# Patient Record
Sex: FEMALE | Race: WHITE | NOT HISPANIC OR LATINO | Employment: FULL TIME | URBAN - METROPOLITAN AREA
[De-identification: names, ages, dates, MRNs, and addresses within clinical notes are randomized per-mention and may not be internally consistent; named-entity substitution may affect disease eponyms.]

---

## 2017-02-21 ENCOUNTER — HOSPITAL ENCOUNTER (OUTPATIENT)
Dept: RADIOLOGY | Facility: MEDICAL CENTER | Age: 52
Discharge: HOME | End: 2017-02-21
Payer: COMMERCIAL

## 2017-02-21 ENCOUNTER — TRANSCRIBE ORDERS (OUTPATIENT)
Dept: URGENT CARE | Facility: MEDICAL CENTER | Age: 52
End: 2017-02-21

## 2017-02-21 DIAGNOSIS — R05.9 COUGH: Primary | ICD-10-CM

## 2017-02-21 DIAGNOSIS — R05.9 COUGH: ICD-10-CM

## 2017-02-21 PROCEDURE — 71020 HB CHEST X-RAY 2VW FRONTAL&LATL: CPT

## 2017-10-25 ENCOUNTER — ALLSCRIPTS OFFICE VISIT (OUTPATIENT)
Dept: OTHER | Facility: OTHER | Age: 52
End: 2017-10-25

## 2017-10-25 DIAGNOSIS — Z12.11 ENCOUNTER FOR SCREENING FOR MALIGNANT NEOPLASM OF COLON: ICD-10-CM

## 2017-10-25 DIAGNOSIS — Z12.31 ENCOUNTER FOR SCREENING MAMMOGRAM FOR MALIGNANT NEOPLASM OF BREAST: ICD-10-CM

## 2017-10-25 DIAGNOSIS — M25.511 PAIN IN RIGHT SHOULDER: ICD-10-CM

## 2017-10-25 DIAGNOSIS — E78.5 HYPERLIPIDEMIA: ICD-10-CM

## 2017-10-25 DIAGNOSIS — R79.89 OTHER SPECIFIED ABNORMAL FINDINGS OF BLOOD CHEMISTRY: ICD-10-CM

## 2017-10-25 DIAGNOSIS — R51.9 HEADACHE: ICD-10-CM

## 2017-10-26 ENCOUNTER — TRANSCRIBE ORDERS (OUTPATIENT)
Dept: ADMINISTRATIVE | Facility: HOSPITAL | Age: 52
End: 2017-10-26

## 2017-10-26 DIAGNOSIS — J45.909 ASTHMATIC BRONCHITIS WITHOUT COMPLICATION, UNSPECIFIED ASTHMA SEVERITY, UNSPECIFIED WHETHER PERSISTENT: Primary | ICD-10-CM
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
## 2017-10-31 ENCOUNTER — TRANSCRIBE ORDERS (OUTPATIENT)
Dept: ADMINISTRATIVE | Facility: HOSPITAL | Age: 52
End: 2017-10-31

## 2017-10-31 ENCOUNTER — APPOINTMENT (OUTPATIENT)
Dept: LAB | Facility: MEDICAL CENTER | Age: 52
End: 2017-10-31
Payer: COMMERCIAL

## 2017-10-31 ENCOUNTER — APPOINTMENT (OUTPATIENT)
Dept: PHYSICAL THERAPY | Facility: MEDICAL CENTER | Age: 52
End: 2017-10-31
Payer: COMMERCIAL

## 2017-10-31 DIAGNOSIS — E78.5 HYPERLIPIDEMIA, UNSPECIFIED HYPERLIPIDEMIA TYPE: ICD-10-CM

## 2017-10-31 DIAGNOSIS — E78.5 HYPERLIPIDEMIA: ICD-10-CM

## 2017-10-31 DIAGNOSIS — R51.9 HEADACHE: ICD-10-CM

## 2017-10-31 DIAGNOSIS — R51.9 FACIAL PAIN: ICD-10-CM

## 2017-10-31 DIAGNOSIS — M25.511 PAIN IN RIGHT SHOULDER: ICD-10-CM

## 2017-10-31 DIAGNOSIS — R51.9 FACIAL PAIN: Primary | ICD-10-CM

## 2017-10-31 LAB
ALBUMIN SERPL BCP-MCNC: 3.7 G/DL (ref 3.5–5)
ALP SERPL-CCNC: 51 U/L (ref 46–116)
ALT SERPL W P-5'-P-CCNC: 21 U/L (ref 12–78)
ANION GAP SERPL CALCULATED.3IONS-SCNC: 8 MMOL/L (ref 4–13)
AST SERPL W P-5'-P-CCNC: 16 U/L (ref 5–45)
BASOPHILS # BLD AUTO: 0.01 THOUSANDS/ΜL (ref 0–0.1)
BASOPHILS NFR BLD AUTO: 0 % (ref 0–1)
BILIRUB SERPL-MCNC: 0.79 MG/DL (ref 0.2–1)
BUN SERPL-MCNC: 13 MG/DL (ref 5–25)
CALCIUM SERPL-MCNC: 9.1 MG/DL (ref 8.3–10.1)
CHLORIDE SERPL-SCNC: 105 MMOL/L (ref 100–108)
CHOLEST SERPL-MCNC: 231 MG/DL (ref 50–200)
CO2 SERPL-SCNC: 24 MMOL/L (ref 21–32)
CREAT SERPL-MCNC: 0.75 MG/DL (ref 0.6–1.3)
EOSINOPHIL # BLD AUTO: 0.04 THOUSAND/ΜL (ref 0–0.61)
EOSINOPHIL NFR BLD AUTO: 1 % (ref 0–6)
ERYTHROCYTE [DISTWIDTH] IN BLOOD BY AUTOMATED COUNT: 16.3 % (ref 11.6–15.1)
GFR SERPL CREATININE-BSD FRML MDRD: 93 ML/MIN/1.73SQ M
GLUCOSE P FAST SERPL-MCNC: 84 MG/DL (ref 65–99)
HCT VFR BLD AUTO: 39 % (ref 34.8–46.1)
HDLC SERPL-MCNC: 55 MG/DL (ref 40–60)
HGB BLD-MCNC: 12.5 G/DL (ref 11.5–15.4)
LDLC SERPL CALC-MCNC: 145 MG/DL (ref 0–100)
LYMPHOCYTES # BLD AUTO: 1.86 THOUSANDS/ΜL (ref 0.6–4.47)
LYMPHOCYTES NFR BLD AUTO: 28 % (ref 14–44)
MCH RBC QN AUTO: 27.7 PG (ref 26.8–34.3)
MCHC RBC AUTO-ENTMCNC: 32.1 G/DL (ref 31.4–37.4)
MCV RBC AUTO: 86 FL (ref 82–98)
MONOCYTES # BLD AUTO: 0.56 THOUSAND/ΜL (ref 0.17–1.22)
MONOCYTES NFR BLD AUTO: 8 % (ref 4–12)
NEUTROPHILS # BLD AUTO: 4.29 THOUSANDS/ΜL (ref 1.85–7.62)
NEUTS SEG NFR BLD AUTO: 63 % (ref 43–75)
NRBC BLD AUTO-RTO: 0 /100 WBCS
PLATELET # BLD AUTO: 364 THOUSANDS/UL (ref 149–390)
PMV BLD AUTO: 11.4 FL (ref 8.9–12.7)
POTASSIUM SERPL-SCNC: 4.2 MMOL/L (ref 3.5–5.3)
PROT SERPL-MCNC: 8.2 G/DL (ref 6.4–8.2)
RBC # BLD AUTO: 4.52 MILLION/UL (ref 3.81–5.12)
SODIUM SERPL-SCNC: 137 MMOL/L (ref 136–145)
TRIGL SERPL-MCNC: 157 MG/DL
TSH SERPL DL<=0.05 MIU/L-ACNC: 2.58 UIU/ML (ref 0.36–3.74)
WBC # BLD AUTO: 6.77 THOUSAND/UL (ref 4.31–10.16)

## 2017-10-31 PROCEDURE — 80061 LIPID PANEL: CPT

## 2017-10-31 PROCEDURE — 97161 PT EVAL LOW COMPLEX 20 MIN: CPT

## 2017-10-31 PROCEDURE — G8991 OTHER PT/OT GOAL STATUS: HCPCS

## 2017-10-31 PROCEDURE — 84443 ASSAY THYROID STIM HORMONE: CPT

## 2017-10-31 PROCEDURE — 36415 COLL VENOUS BLD VENIPUNCTURE: CPT

## 2017-10-31 PROCEDURE — 85025 COMPLETE CBC W/AUTO DIFF WBC: CPT

## 2017-10-31 PROCEDURE — G8990 OTHER PT/OT CURRENT STATUS: HCPCS

## 2017-10-31 PROCEDURE — 80053 COMPREHEN METABOLIC PANEL: CPT

## 2017-10-31 PROCEDURE — 97110 THERAPEUTIC EXERCISES: CPT

## 2017-11-01 ENCOUNTER — GENERIC CONVERSION - ENCOUNTER (OUTPATIENT)
Dept: OTHER | Facility: OTHER | Age: 52
End: 2017-11-01

## 2017-11-02 ENCOUNTER — APPOINTMENT (OUTPATIENT)
Dept: LAB | Facility: MEDICAL CENTER | Age: 52
End: 2017-11-02
Payer: COMMERCIAL

## 2017-11-02 DIAGNOSIS — R79.89 OTHER SPECIFIED ABNORMAL FINDINGS OF BLOOD CHEMISTRY: ICD-10-CM

## 2017-11-02 LAB
FERRITIN SERPL-MCNC: 6 NG/ML (ref 8–388)
IRON SATN MFR SERPL: 10 %
IRON SERPL-MCNC: 46 UG/DL (ref 50–170)
TIBC SERPL-MCNC: 456 UG/DL (ref 250–450)

## 2017-11-02 PROCEDURE — 82728 ASSAY OF FERRITIN: CPT

## 2017-11-02 PROCEDURE — 83550 IRON BINDING TEST: CPT

## 2017-11-02 PROCEDURE — 83540 ASSAY OF IRON: CPT

## 2017-11-02 PROCEDURE — 36415 COLL VENOUS BLD VENIPUNCTURE: CPT

## 2017-11-03 ENCOUNTER — HOSPITAL ENCOUNTER (OUTPATIENT)
Dept: PULMONOLOGY | Facility: HOSPITAL | Age: 52
Discharge: HOME | End: 2017-11-03
Payer: COMMERCIAL

## 2017-11-03 ENCOUNTER — GENERIC CONVERSION - ENCOUNTER (OUTPATIENT)
Dept: OTHER | Facility: OTHER | Age: 52
End: 2017-11-03

## 2017-11-03 DIAGNOSIS — J45.909 ASTHMATIC BRONCHITIS WITHOUT COMPLICATION, UNSPECIFIED ASTHMA SEVERITY, UNSPECIFIED WHETHER PERSISTENT: ICD-10-CM

## 2017-11-03 PROCEDURE — 94060 EVALUATION OF WHEEZING: CPT

## 2017-11-03 PROCEDURE — 94760 N-INVAS EAR/PLS OXIMETRY 1: CPT

## 2017-11-03 PROCEDURE — 94729 DIFFUSING CAPACITY: CPT

## 2017-11-03 PROCEDURE — 94727 GAS DIL/WSHOT DETER LNG VOL: CPT

## 2017-11-03 RX ORDER — ALBUTEROL SULFATE 2.5 MG/3ML
2.5 SOLUTION RESPIRATORY (INHALATION) ONCE AS NEEDED
Status: COMPLETED | OUTPATIENT
Start: 2017-11-03 | End: 2017-11-03

## 2017-11-03 RX ADMIN — ALBUTEROL SULFATE 2.5 MG: 2.5 SOLUTION RESPIRATORY (INHALATION) at 07:47

## 2017-11-07 ENCOUNTER — APPOINTMENT (OUTPATIENT)
Dept: PHYSICAL THERAPY | Facility: MEDICAL CENTER | Age: 52
End: 2017-11-07
Payer: COMMERCIAL

## 2017-11-07 PROCEDURE — 97110 THERAPEUTIC EXERCISES: CPT

## 2017-11-07 PROCEDURE — 97140 MANUAL THERAPY 1/> REGIONS: CPT

## 2017-11-08 ENCOUNTER — GENERIC CONVERSION - ENCOUNTER (OUTPATIENT)
Dept: OTHER | Facility: OTHER | Age: 52
End: 2017-11-08

## 2017-11-09 ENCOUNTER — APPOINTMENT (OUTPATIENT)
Dept: PHYSICAL THERAPY | Facility: MEDICAL CENTER | Age: 52
End: 2017-11-09
Payer: COMMERCIAL

## 2017-11-09 PROCEDURE — 97140 MANUAL THERAPY 1/> REGIONS: CPT

## 2017-11-09 PROCEDURE — 97110 THERAPEUTIC EXERCISES: CPT

## 2017-11-13 ENCOUNTER — APPOINTMENT (OUTPATIENT)
Dept: PHYSICAL THERAPY | Facility: MEDICAL CENTER | Age: 52
End: 2017-11-13
Payer: COMMERCIAL

## 2017-11-13 PROCEDURE — 97110 THERAPEUTIC EXERCISES: CPT

## 2017-11-13 PROCEDURE — 97140 MANUAL THERAPY 1/> REGIONS: CPT

## 2017-11-15 ENCOUNTER — APPOINTMENT (OUTPATIENT)
Dept: PHYSICAL THERAPY | Facility: MEDICAL CENTER | Age: 52
End: 2017-11-15
Payer: COMMERCIAL

## 2017-11-15 PROCEDURE — 97140 MANUAL THERAPY 1/> REGIONS: CPT

## 2017-11-15 PROCEDURE — 97110 THERAPEUTIC EXERCISES: CPT

## 2017-11-17 ENCOUNTER — ALLSCRIPTS OFFICE VISIT (OUTPATIENT)
Dept: OTHER | Facility: OTHER | Age: 52
End: 2017-11-17

## 2017-11-20 ENCOUNTER — APPOINTMENT (OUTPATIENT)
Dept: PHYSICAL THERAPY | Facility: MEDICAL CENTER | Age: 52
End: 2017-11-20
Payer: COMMERCIAL

## 2017-11-20 PROCEDURE — 97110 THERAPEUTIC EXERCISES: CPT

## 2017-11-20 PROCEDURE — 97140 MANUAL THERAPY 1/> REGIONS: CPT

## 2017-11-21 ENCOUNTER — HOSPITAL ENCOUNTER (OUTPATIENT)
Dept: RADIOLOGY | Facility: MEDICAL CENTER | Age: 52
Discharge: HOME | End: 2017-11-21
Payer: COMMERCIAL

## 2017-11-21 DIAGNOSIS — Z12.31 ENCOUNTER FOR SCREENING MAMMOGRAM FOR MALIGNANT NEOPLASM OF BREAST: ICD-10-CM

## 2017-11-21 PROCEDURE — G0202 SCR MAMMO BI INCL CAD: HCPCS

## 2017-11-21 PROCEDURE — 77063 BREAST TOMOSYNTHESIS BI: CPT

## 2017-11-24 ENCOUNTER — GENERIC CONVERSION - ENCOUNTER (OUTPATIENT)
Dept: OTHER | Facility: OTHER | Age: 52
End: 2017-11-24

## 2017-11-24 ENCOUNTER — APPOINTMENT (OUTPATIENT)
Dept: PHYSICAL THERAPY | Facility: MEDICAL CENTER | Age: 52
End: 2017-11-24
Payer: COMMERCIAL

## 2017-11-24 ENCOUNTER — APPOINTMENT (OUTPATIENT)
Dept: LAB | Facility: MEDICAL CENTER | Age: 52
End: 2017-11-24
Payer: COMMERCIAL

## 2017-11-24 DIAGNOSIS — Z12.11 ENCOUNTER FOR SCREENING FOR MALIGNANT NEOPLASM OF COLON: ICD-10-CM

## 2017-11-24 LAB — HEMOCCULT STL QL IA: NEGATIVE

## 2017-11-24 PROCEDURE — 97110 THERAPEUTIC EXERCISES: CPT

## 2017-11-24 PROCEDURE — G0328 FECAL BLOOD SCRN IMMUNOASSAY: HCPCS

## 2017-11-24 PROCEDURE — 97140 MANUAL THERAPY 1/> REGIONS: CPT

## 2017-11-28 ENCOUNTER — APPOINTMENT (OUTPATIENT)
Dept: PHYSICAL THERAPY | Facility: MEDICAL CENTER | Age: 52
End: 2017-11-28
Payer: COMMERCIAL

## 2017-11-28 RX ORDER — FERROUS SULFATE 300 MG/5ML
300 LIQUID (ML) ORAL DAILY
COMMUNITY
End: 2018-05-01

## 2017-11-28 RX ORDER — LOTEPREDNOL ETABONATE 5 MG/ML
1 SUSPENSION/ DROPS OPHTHALMIC 4 TIMES DAILY
COMMUNITY
End: 2019-06-24 | Stop reason: ALTCHOICE

## 2017-11-28 RX ORDER — CYCLOSPORINE 0.5 MG/ML
1 EMULSION OPHTHALMIC 2 TIMES DAILY
COMMUNITY

## 2017-11-28 RX ORDER — DIPHENOXYLATE HYDROCHLORIDE AND ATROPINE SULFATE 2.5; .025 MG/1; MG/1
1 TABLET ORAL DAILY
COMMUNITY

## 2017-11-30 ENCOUNTER — APPOINTMENT (OUTPATIENT)
Dept: PHYSICAL THERAPY | Facility: MEDICAL CENTER | Age: 52
End: 2017-11-30
Payer: COMMERCIAL

## 2017-12-05 ENCOUNTER — GENERIC CONVERSION - ENCOUNTER (OUTPATIENT)
Dept: OTHER | Facility: OTHER | Age: 52
End: 2017-12-05

## 2017-12-05 ENCOUNTER — ANESTHESIA (OUTPATIENT)
Dept: PERIOP | Facility: HOSPITAL | Age: 52
End: 2017-12-05
Payer: COMMERCIAL

## 2017-12-05 ENCOUNTER — ANESTHESIA EVENT (OUTPATIENT)
Dept: PERIOP | Facility: HOSPITAL | Age: 52
End: 2017-12-05
Payer: COMMERCIAL

## 2017-12-05 ENCOUNTER — APPOINTMENT (OUTPATIENT)
Dept: PHYSICAL THERAPY | Facility: MEDICAL CENTER | Age: 52
End: 2017-12-05
Payer: COMMERCIAL

## 2017-12-05 ENCOUNTER — HOSPITAL ENCOUNTER (OUTPATIENT)
Facility: HOSPITAL | Age: 52
Setting detail: OUTPATIENT SURGERY
Discharge: HOME | End: 2017-12-05
Attending: INTERNAL MEDICINE | Admitting: INTERNAL MEDICINE
Payer: COMMERCIAL

## 2017-12-05 VITALS
SYSTOLIC BLOOD PRESSURE: 127 MMHG | BODY MASS INDEX: 20.81 KG/M2 | TEMPERATURE: 97.6 F | HEIGHT: 62 IN | HEART RATE: 62 BPM | RESPIRATION RATE: 27 BRPM | WEIGHT: 113.1 LBS | DIASTOLIC BLOOD PRESSURE: 67 MMHG | OXYGEN SATURATION: 100 %

## 2017-12-05 DIAGNOSIS — Z12.11 ENCOUNTER FOR SCREENING FOR MALIGNANT NEOPLASM OF COLON: ICD-10-CM

## 2017-12-05 DIAGNOSIS — E61.1 IRON DEFICIENCY: ICD-10-CM

## 2017-12-05 PROCEDURE — 88305 TISSUE EXAM BY PATHOLOGIST: CPT | Performed by: INTERNAL MEDICINE

## 2017-12-05 RX ORDER — IBUPROFEN 200 MG
TABLET ORAL EVERY 6 HOURS PRN
COMMUNITY
End: 2018-06-19

## 2017-12-05 RX ORDER — SODIUM CHLORIDE, SODIUM LACTATE, POTASSIUM CHLORIDE, CALCIUM CHLORIDE 600; 310; 30; 20 MG/100ML; MG/100ML; MG/100ML; MG/100ML
125 INJECTION, SOLUTION INTRAVENOUS CONTINUOUS
Status: DISCONTINUED | OUTPATIENT
Start: 2017-12-05 | End: 2017-12-05 | Stop reason: HOSPADM

## 2017-12-05 RX ORDER — PROPOFOL 10 MG/ML
INJECTION, EMULSION INTRAVENOUS AS NEEDED
Status: DISCONTINUED | OUTPATIENT
Start: 2017-12-05 | End: 2017-12-05 | Stop reason: SURG

## 2017-12-05 RX ADMIN — PROPOFOL 50 MG: 10 INJECTION, EMULSION INTRAVENOUS at 08:39

## 2017-12-05 RX ADMIN — PROPOFOL 100 MG: 10 INJECTION, EMULSION INTRAVENOUS at 08:30

## 2017-12-05 RX ADMIN — PROPOFOL 50 MG: 10 INJECTION, EMULSION INTRAVENOUS at 08:33

## 2017-12-05 RX ADMIN — SODIUM CHLORIDE, SODIUM LACTATE, POTASSIUM CHLORIDE, AND CALCIUM CHLORIDE: .6; .31; .03; .02 INJECTION, SOLUTION INTRAVENOUS at 08:08

## 2017-12-05 NOTE — OP NOTE
**** GI/ENDOSCOPY REPORT ****     PATIENT NAME: Anastasia Linn ------ VISIT ID:  Patient ID: [id number]   YOB: 1965     INTRODUCTION: Colonoscopy - A 49 female patient presents for an outpatient   Colonoscopy at [address] [place] [address]  PREVIOUS COLONOSCOPY: none     INDICATIONS: Screening-ADR  CONSENT:  The benefits, risks, and alternatives to the procedure were   discussed and informed consent was obtained from the patient  PREPARATION: EKG, pulse, pulse oximetry and blood pressure were monitored   throughout the procedure  The patient was identified by myself both   verbally and by visual inspection of ID band  Airway Assessment   Classification: Airway class 2 - Visualization of the soft palate, fauces   and uvula  ASA Classification: Class 2 - Patient has mild to moderate   systemic disturbance that may or may not be related to the disorder   requiring surgery  MEDICATIONS: Anesthesia-check records     PROCEDURE:  The endoscope was passed without difficulty through the anus   under direct visualization and advanced to the cecum, confirmed by   appendiceal orifice and ileocecal valve  The scope was withdrawn and the   mucosa was carefully examined  The quality of the preparation was   excellent  Cecal Intubation Time: 3 minutes(s) Scope Withdrawal Time: 5   minutes(s)     RECTAL EXAM: Normal rectal exam      FINDINGS:  A single polyp, measuring less than 5 mm in size, was found in   the rectum  The polyp was completely removed by hot biopsy polypectomy  The polyp was retrieved  Otherwise, the colon appeared to be normal    Normal retroversion     COMPLICATIONS: There were no complications  IMPRESSIONS: A single polyp found in the rectum; removed by hot biopsy   polypectomy  RECOMMENDATIONS: Follow-up on the results of the biopsy specimens  Colonoscopy recommended in 5 years  ESTIMATED BLOOD LOSS: None       PATHOLOGY SPECIMENS: Single polyp completely removed by hot biopsy   polypectomy   PROCEDURE CODES: : Colonoscopy with removal of tumor(s), polyp(s), or   other lesion(s) by hot biopsy forceps or bipolar cautery     ICD-9 Codes: 211 4 Benign neoplasm of rectum and anal canal     ICD-10 Codes: K63 5 Polyp of colon     PERFORMED BY: MEJIA Sharif  on 12/05/2017  Version 1, electronically signed by MEJIA Stanley , D O  on   12/05/2017 at 08:42

## 2017-12-05 NOTE — ANESTHESIA PREPROCEDURE EVALUATION
Review of Systems/Medical History  Patient summary reviewed  Chart reviewed  No history of anesthetic complications     Cardiovascular  EKG reviewed, Hyperlipidemia,    Pulmonary  Negative pulmonary ROS ,        GI/Hepatic  Negative GI/hepatic ROS     Comment: Iron deficiency     Negative  ROS        Endo/Other  Negative endo/other ROS      GYN  Negative gynecology ROS          Hematology  Negative hematology ROS      Musculoskeletal  Negative musculoskeletal ROS        Neurology  Negative neurology ROS      Psychology   Negative psychology ROS            Physical Exam    Airway    Mallampati score: II  TM Distance: >3 FB  Neck ROM: full     Dental   No notable dental hx     Cardiovascular  Cardiovascular exam normal    Pulmonary  Pulmonary exam normal Breath sounds clear to auscultation,     Other Findings        Anesthesia Plan  ASA Score- 2       Anesthesia Type- IV sedation with anesthesia with ASA Monitors  Additional Monitors:   Airway Plan:           Induction- intravenous  Informed Consent- Anesthetic plan and risks discussed with patient  I personally reviewed this patient with the CRNA  Discussed and agreed on the Anesthesia Plan with the CRNA       Lab Results   Component Value Date    WBC 6 77 10/31/2017    HGB 12 5 10/31/2017    HCT 39 0 10/31/2017    MCV 86 10/31/2017     10/31/2017     Lab Results   Component Value Date    GLUCOSE 93 10/04/2016    CALCIUM 9 1 10/31/2017     10/31/2017    K 4 2 10/31/2017    CO2 24 10/31/2017     10/31/2017    BUN 13 10/31/2017    CREATININE 0 75 10/31/2017     No results found for: INR, PROTIME  No results found for: PTT  Type and Screen:        I, Dr Johnson Bernal, the attending physician, have personally seen and evaluated the patient prior to anesthetic care  I have reviewed the pre-anesthetic record, and other medical records if appropriate to the anesthetic care   If a CRNA is involved in the case, I have reviewed the CRNA assessment, if present, and agree  The patient is in a suitable condition to proceed with my formulated anesthetic plan

## 2017-12-05 NOTE — DISCHARGE INSTRUCTIONS
Colonoscopy   WHAT YOU NEED TO KNOW:   A colonoscopy is a procedure to examine the inside of your colon (intestine) with a scope  Polyps or tissue growths may have been removed during your colonoscopy  It is normal to feel bloated and to have some abdominal discomfort  You should be passing gas  If you have hemorrhoids or you had polyps removed, you may have a small amount of bleeding  DISCHARGE INSTRUCTIONS:   Seek care immediately if:   · You have a large amount of bright red blood in your bowel movements  · Your abdomen is hard and firm and you have severe pain  · You have sudden trouble breathing  Contact your healthcare provider if:   · You develop a rash or hives  · You have a fever within 24 hours of your procedure  · You have not had a bowel movement for 3 days after your procedure  · You have questions or concerns about your condition or care  Activity:   · Do not lift, strain, or run  for 3 days after your procedure  · Rest after your procedure  You have been given medicine to relax you  Do not  drive or make important decisions until the day after your procedure  Return to your normal activity as directed  · Relieve gas and discomfort from bloating  by lying on your right side with a heating pad on your abdomen  You may need to take short walks to help the gas move out  Eat small meals until bloating is relieved  If you had polyps removed: For 7 days after your procedure:  · Do not  take aspirin  · Do not  go on long car rides  Help prevent constipation:   · Eat a variety of healthy foods  Healthy foods include fruit, vegetables, whole-grain breads, low-fat dairy products, beans, lean meat, and fish  Ask if you need to be on a special diet  Your healthcare provider may recommend that you eat high-fiber foods such as cooked beans  Fiber helps you have regular bowel movements  · Drink liquids as directed    Adults should drink between 9 and 13 eight-ounce cups of liquid every day  Ask what amount is best for you  For most people, good liquids to drink are water, juice, and milk  · Exercise as directed  Talk to your healthcare provider about the best exercise plan for you  Exercise can help prevent constipation, decrease your blood pressure and improve your health  Follow up with your healthcare provider as directed:  Write down your questions so you remember to ask them during your visits  © 2017 [employer] Information is for End User's use only and may not be sold, redistributed or otherwise used for commercial purposes  All illustrations and images included in CareNotes® are the copyrighted property of [employer]  or [employer]  The above information is an  only  It is not intended as medical advice for individual conditions or treatments  Talk to your doctor, nurse or pharmacist before following any medical regimen to see if it is safe and effective for you

## 2017-12-05 NOTE — ANESTHESIA POSTPROCEDURE EVALUATION
Post-Op Assessment Note      CV Status:  Stable    Mental Status:  Somnolent    Hydration Status:  Euvolemic    PONV Controlled:  Controlled    Airway Patency:  Patent    Post Op Vitals Reviewed: Yes          Staff: CRNA           BP   102/51   Temp 98 4   Pulse 66   Resp 16   SpO2 98

## 2017-12-07 ENCOUNTER — GENERIC CONVERSION - ENCOUNTER (OUTPATIENT)
Dept: FAMILY MEDICINE CLINIC | Facility: MEDICAL CENTER | Age: 52
End: 2017-12-07

## 2017-12-07 ENCOUNTER — APPOINTMENT (OUTPATIENT)
Dept: PHYSICAL THERAPY | Facility: MEDICAL CENTER | Age: 52
End: 2017-12-07
Payer: COMMERCIAL

## 2017-12-07 PROCEDURE — 97110 THERAPEUTIC EXERCISES: CPT

## 2017-12-07 PROCEDURE — 97140 MANUAL THERAPY 1/> REGIONS: CPT

## 2017-12-07 PROCEDURE — G8991 OTHER PT/OT GOAL STATUS: HCPCS

## 2017-12-07 PROCEDURE — G8990 OTHER PT/OT CURRENT STATUS: HCPCS

## 2017-12-26 ENCOUNTER — GENERIC CONVERSION - ENCOUNTER (OUTPATIENT)
Dept: OTHER | Facility: OTHER | Age: 52
End: 2017-12-26

## 2018-01-10 NOTE — RESULT NOTES
Verified Results  [address] [facility] 21Nov2017 08:31AM Yanna Mancuso Order Number: [id number]    - Patient Instructions: To schedule this appointment, please contact Central Scheduling at [phone]  Do not wear any perfume, powder, lotion or deodorant on breast or underarm area  Please bring your doctors order, referral (if needed) and insurance information with you on the day of the test  Failure to bring this information may result in this test being rescheduled  Arrive 15 minutes prior to your appointment time to register  On the day of your test, please bring any prior mammogram or breast studies with you that were not performed at a [facility]  Failure to bring prior exams may result in your test needing to be rescheduled  Test Name Result Flag Reference   MAMMO SCREENING BILATERAL W 3D & CAD (Report)     Patient History:   Family history of prostate cancer at age 76 in maternal uncle  Patient has never smoked  Patient's BMI is 21 9  Reason for exam: screening, asymptomatic  Mammo Screening Bilateral W DBT and CAD: November 21, 2017 -    Check In #: [de-identified]   2D/3D Procedure   3D views: Bilateral MLO view(s) were taken  CC view(s) were    taken of the right breast    2D views: Bilateral CC and XCCL view(s) were taken  Technologist: RT Romaine RM   Prior study comparison: November 8, 2016, mammo screening    bilateral W CAD performed at [address] [facility]  March 11, 2015, bilateral [facility] digtl scrn mammo w/CAD performed   at [address] [facility]  There are scattered fibroglandular densities  A combination of    mediolateral oblique 3D tomographic slices as well as standard    two-dimensional orthogonal images were obtained  Multiple    circumscribed round and ovoid waxing and waning nodules most    consistent with that of cysts   No new dominant soft tissue mass,   architectural distortion or suspicious calcifications are noted  The skin and nipple structures are within normal limits  Benign appearing calcifications are noted  No mammographic evidence of malignancy  No    significant changes when compared with prior studies  ACR BI-RADSï¾® Assessments: BiRad:2 - Benign     Recommendation:   Routine screening mammogram of both breasts in 1 year  The patient is scheduled in a reminder system for screening    mammography  8-10% of cancers will be missed on mammography  Management of a    palpable abnormality must be based on clinical grounds  Patients    will be notified of their results via letter from our facility  Accredited by [employer] of Radiology and FDA       Transcription Location:  [address]: [id number]     Risk Value(s):   Tyrer-Cuzick 10 Year: 1 400%, Tyrer-Cuzick Lifetime: 6 300%,    Myriad Table: 1 5%, MINE 5 Year: 0 5%, NCI Lifetime: 4 6%   Signed by:   Jorge Villafuerte MD   11/22/17

## 2018-01-13 VITALS
RESPIRATION RATE: 14 BRPM | DIASTOLIC BLOOD PRESSURE: 50 MMHG | BODY MASS INDEX: 23.83 KG/M2 | HEIGHT: 62 IN | WEIGHT: 129.5 LBS | HEART RATE: 70 BPM | SYSTOLIC BLOOD PRESSURE: 90 MMHG

## 2018-01-13 NOTE — CONSULTS
I had the pleasure of evaluating your patient, Rosalee Carmona  My full evaluation follows:      Chief Complaint  Screening      History of Present Illness  26-year-old female referred for screening colonoscopy  According to the patient's record she has iron deficient indices  She does have heavy menses and this has been attributed to fibroids  Patient has no abdominal pain, change in bowel habits, change in stool caliber, melanotic stool, hematochezia, rectal bleeding, tenesmus, or weight loss  No hematemesis, epistaxis, bleeding gums, or any other source of blood  No family history of colon disease  Review of Systems    Constitutional: No fever, no chills, feels well, no tiredness, no recent weight gain or weight loss  Eyes: No complaints of eye pain, no red eyes, no eyesight problems, no discharge, no dry eyes, no itching of eyes  ENT: no complaints of earache, no loss of hearing, no nose bleeds, no nasal discharge, no sore throat, no hoarseness  Cardiovascular: No complaints of slow heart rate, no fast heart rate, no chest pain, no palpitations, no leg claudication, no lower extremity edema  Respiratory: No complaints of shortness of breath, no wheezing, no cough, no SOB on exertion, no orthopnea, no PND  Gastrointestinal: No complaints of abdominal pain, no constipation, no nausea or vomiting, no diarrhea, no bloody stools  Genitourinary: No complaints of dysuria, no incontinence, no pelvic pain, no dysmenorrhea, no vaginal discharge or bleeding  Musculoskeletal: Back pain  Integumentary: No complaints of skin rash or lesions, no itching, no skin wounds, no breast pain or lump  Neurological: No complaints of headache, no confusion, no convulsions, no numbness, no dizziness or fainting, no tingling, no limb weakness, no difficulty walking  Psychiatric: Not suicidal, no sleep disturbance, no anxiety or depression, no change in personality, no emotional problems     Endocrine: No complaints of proptosis, no hot flashes, no muscle weakness, no deepening of the voice, no feelings of weakness  Hematologic/Lymphatic: No complaints of swollen glands, no swollen glands in the neck, does not bleed easily, does not bruise easily  Active Problems    1  Abnormal CBC (790 6) (R79 89)   2  Dry eye syndrome (375 15) (H04 129)   3  Encounter for gynecological examination without abnormal finding (V72 31) (Z01 419)   4  Encounter for routine gynecological examination with Papanicolaou smear of cervix   (V72 31,V76 2) (Z01 419)   5  Encounter for screening mammogram for malignant neoplasm of breast (V76 12)   (Z12 31)   6  Headache (784 0) (R51)   7  HPV (human papilloma virus) infection (079 4) (B97 7)   8  Hyperlipidemia (272 4) (E78 5)   9  Iron deficiency (280 9) (E61 1)   10  Right shoulder pain (719 41) (M25 511)   11  Screening for colon cancer (V76 51) (Z12 11)   12  Screening for HPV (human papillomavirus) (V73 81) (Z11 51)    Past Medical History    · History of ASCUS with positive high risk HPV cervical (795 01,795 05) (R87 610,R87 810)   · Encounter for routine gynecological examination with Papanicolaou smear of cervix  (V72 31,V76 2) (Z01 419)   · History of  2 (V22 2) (Z33 1)   · History of asthma (V12 69) (Z87 09)   · History of dysfunctional uterine bleeding (V13 29) (Z87 42)   · Normal delivery (650) (O80,Z37  9)    The active problems and past medical history were reviewed and updated today  Surgical History    · History of Biopsy Breast Percutaneous Needle Core   · History of Colposcopy Cervix With Biopsy(S)   · History of Endometrial Biopsy By Suction   · History of Renal Lithotripsy   · History of Tubal Ligation    The surgical history was reviewed and updated today         Family History    · Family history of arthritis (V17 7) (Z82 61)   · Family history of hypertension (V17 49) (Z82 49)    · Family history of diabetes mellitus (V18 0) (Z83 3)    The family history was reviewed and updated today  Social History    · Drinks coffee   · Exercise habits   · Never a smoker   · No alcohol use   · Sexual abstinence   · Denied: History of Sexually active   · Single   · Two children   · Denied: History of Uses drugs daily  The social history was reviewed and updated today  The social history was reviewed and is unchanged  Current Meds   1  Ferrous Sulfate 325 (65 Fe) MG Oral Tablet; TAKE 1 TABLET 3 TIMES DAILY WITH FOOD; Therapy: 41NIV5455 to (Complete:01Feb2018)  Requested for: 12QFI0454; Last   Rx:03Nov2017 Ordered   2  Lotemax 0 5 % Ophthalmic Suspension; Therapy: (Recorded:25Oct2017) to Recorded   3  Multi-Vitamin Daily Oral Tablet; Therapy: (UPSEQKEN:42IEI8116) to Recorded   4  Restasis 0 05 % Ophthalmic Emulsion; Therapy: (Recorded:25Oct2017) to Recorded    The medication list was reviewed and updated today  Allergies    1  Penicillins    Vitals   Recorded: 84YOT7966 51:72XB   Systolic 357   Diastolic 68   Height 5 ft 2 in   Weight 129 lb    BMI Calculated 23 59   BSA Calculated 1 59     Physical Exam    Constitutional   General appearance: No acute distress, well appearing and well nourished  Eyes   Conjunctiva and lids: No swelling, erythema or discharge  Pupils and irises: Equal, round and reactive to light  Ears, Nose, Mouth, and Throat   External inspection of ears and nose: Normal     Nasal mucosa, septum, and turbinates: Normal without edema or erythema  Oropharynx: Normal with no erythema, edema, exudate or lesions  Pulmonary   Respiratory effort: No increased work of breathing or signs of respiratory distress  Auscultation of lungs: Clear to auscultation  Cardiovascular   Auscultation of heart: Normal rate and rhythm, normal S1 and S2, without murmurs  Examination of extremities for edema and/or varicosities: Normal     Carotid pulses: Normal     Abdomen   Abdomen: Non-tender, no masses      Liver and spleen: No hepatomegaly or splenomegaly  Lymphatic   Palpation of lymph nodes in neck: No lymphadenopathy  Musculoskeletal   Digits and nails: Normal without clubbing or cyanosis  Inspection/palpation of joints, bones, and muscles: Normal     Skin   Skin and subcutaneous tissue: Normal without rashes or lesions  Neurologic No nystagmus or asterixis  Psychiatric   Orientation to person, place, and time: Normal     Mood and affect: Normal          Assessment    1  Iron deficiency (280 9) (E61 1)   2  Screening for colon cancer (V76 51) (Z12 11)    Plan  Screening for colon cancer    · (1) OCCULT BLOOD, FECAL IMMUNOCHEMICAL TEST; Status:Active; Requested  for:17Nov2017;    Perform:[facility]; XKD:[id number]; Ordered; For:Screening for colon cancer; Ordered By:Edwina Reeder;   · COLONOSCOPY; Status:Active; Requested for:17Nov2017;    Perform:[facility]; EWX:[id number]; Ordered; For:Screening for colon cancer; Ordered By:Edwina Reeder;   · ENDOSCOPY - PROCEDURE, RISKS, AND BENEFITS; Status:Complete;   Done:  93ZMK4794   Ordered; For:Screening for colon cancer; Ordered By:Edwina Reeder;    Discussion/Summary    Iron deficient indices in a 80-year-old female referred for screening colonoscopy  Plan, colonoscopy will be tentatively scheduled for 2 weeks from now  We will do a fecal immunochemical test  If positive also perform EGD and capsule  If negative colonoscopy will suffice as a screening procedure  Continue other current management  Thank you very much for allowing me to participate in the care of this patient  If you have any questions, please do not hesitate to contact me        Future Appointments    Signatures   Electronically signed by : MEJIA Lopez ; Nov 17 2017  7:27AM EST                       (Author)

## 2018-01-14 VITALS
HEIGHT: 62 IN | DIASTOLIC BLOOD PRESSURE: 68 MMHG | BODY MASS INDEX: 23.74 KG/M2 | SYSTOLIC BLOOD PRESSURE: 112 MMHG | WEIGHT: 129 LBS

## 2018-01-14 NOTE — RESULT NOTES
Verified Results  (1) IRON PANEL 01BIU4642 08:13AM Gisele Goodman Order Number: [id number]     Test Name Result Flag Reference   IRON 46 ug/dL L    Patients treated with metal-binding drugs (ie  Deferoxamine) may have depressed iron values     FERRITIN 6 ng/mL L 8-388   TOTAL IRON BINDING CAPACITY 456 ug/dL H 250-450   IRON SATURATION 10 %

## 2018-01-15 NOTE — PROGRESS NOTES
Assessment    1  Encounter for preventive health examination (V70 0) (Z00 00)    Plan  Asthma    · Complete PFT; Status:Active; Requested OLX:[id number]; Asthma, Headache, Hyperlipidemia, Right shoulder pain    · Follow-up visit in 6 months Evaluation and Treatment  Follow-up  Status: Complete   Done: 57YPO3808  Encounter for screening mammogram for malignant neoplasm of breast    · * MAMMO SCREENING BILATERAL W CAD; Status:Active; Requested DRL:45EPM7241; Headache, Hyperlipidemia    · (1) CBC/PLT/DIFF; Status:Active; Requested DSJ:26KVB2630;    · (1) COMPREHENSIVE METABOLIC PANEL; Status:Active; Requested SRS:99OXE6377;    · (1) LIPID PANEL, FASTING; Status:Active; Requested TLW:57LNR7544;    · (1) TSH WITH FT4 REFLEX; Status:Active; Requested BMA:71XTR9045; Health Maintenance    · *VB-Depression Screening; Status:Complete;   Done: 27QFK9927 12:22PM   · Follow-up visit in 1 year Evaluation and Treatment  Follow-up  Status: Complete  Done:  67Bqb2706  Right shoulder pain    · *1 - [facility]  *  Status: Active  Requested  for: 79NIR8130  Care Summary provided  : Yes  Screening for colon cancer    · *1 - [facility]  *  Status:  Active  Requested for: 45GVK4414  Care Summary provided  : Yes    Discussion/Summary  health maintenance visit Currently, she eats a healthy diet and has an adequate exercise regimen  cervical cancer screening is managed by Gynecologist  Breast cancer screening: the risks and benefits of breast cancer screening were discussed and mammogram has been ordered  Colorectal cancer screening: the risks and benefits of colorectal cancer screening were discussed and Referral placed for Gastroenterology  Screening lab work includes Per orders  The risks and benefits of immunizations were discussed and patient declines immunizations  She was advised to be evaluated by Current eye doctor   Advice and education were given regarding nutrition, aerobic exercise and cardiovascular risk reduction  Patient discussion: discussed with the patient  Follow-up in 1 year or sooner if needed  History of Present Illness  HM, Adult Female: The patient is being seen for a health maintenance evaluation  The last health maintenance visit was 1 year(s) ago  General Health: The patient's health since the last visit is described as good  She has regular dental visits  She complains of vision problems  Vision care includes wearing glasses  Lifestyle:  She consumes a diverse and healthy diet  She exercises regularly  She does not use tobacco  She denies alcohol use  She denies drug use  Screening:   HPI: Patient presents for a physical      Needs an order for mammogram      Has not had a colonoscopy yet  Review of Systems    Constitutional: no fever  Cardiovascular: no chest pain  Respiratory: no shortness of breath  Gastrointestinal: no abdominal pain and no blood in stools  Genitourinary: no dysuria  Musculoskeletal: arthralgias and myalgias  Neurological: headache  Psychiatric: no anxiety and no depression  Over the past 2 weeks, how often have you been bothered by the following problems? 1 ) Little interest or pleasure in doing things? Not at all    2 ) Feeling down, depressed or hopeless? 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 4) (B97 7)   7   Screening for HPV (human papillomavirus) (V73 81) (Z11 51)    Past Medical History    · History of ASCUS with positive high risk HPV cervical (795 01,795 05)  (R87 610,R87 810)   · Encounter for routine gynecological examination with Papanicolaou smear of cervix  (V72 31,V76 2) (Z01 419)   · History of  2 (V22 2) (Z33 1)   · History of dysfunctional uterine bleeding (V13 29) (Z87 42)   · Normal delivery (650) (O80,Z37  9)    Surgical History    · History of Biopsy Breast Percutaneous Needle Core   · History of Colposcopy Cervix With Biopsy(S)   · History of Endometrial Biopsy By Suction   · History of Renal Lithotripsy   · History of Tubal Ligation    Family History  Mother    · Family history of arthritis (V17 7) (Z82 61)   · Family history of hypertension (V17 49) (Z82 49)  Father    · Family history of diabetes mellitus (V18 0) (Z83 3)    Social History    · Drinks coffee   · Exercise habits   · Never a smoker   · No alcohol use   · Sexual abstinence   · Denied: History of Sexually active   · Single   · Two children   · Denied: History of Uses drugs daily    Current Meds   1  Dulera 200-5 MCG/ACT Inhalation Aerosol; Therapy: (Recorded:2017) to Recorded   2  Lotemax 0 5 % Ophthalmic Suspension; Therapy: (Recorded:2017) to Recorded   3  Montelukast Sodium 10 MG Oral Tablet; Therapy: (BVWQWQZA:58TQL1182) to Recorded   4  Multi-Vitamin Daily Oral Tablet; Therapy: (WIYCQQQA:53DMU3001) to Recorded   5  Restasis 0 05 % Ophthalmic Emulsion; Therapy: (Recorded:80Xjp6320) to Recorded    Allergies    1  Penicillins    Vitals   Recorded: 49DFX6854 08:21AM   Heart Rate 70   Respiration 14   Systolic 90   Diastolic 50   Height 5 ft 2 in   Weight 129 lb 8 oz   BMI Calculated 23 69   BSA Calculated 1 59     Physical Exam    Constitutional   General appearance: No acute distress, well appearing and well nourished  Head and Face   Head and face: Normal     Eyes   Conjunctiva and lids: No swelling, erythema or discharge  Pupils and irises: Equal, round, reactive to light      Ears, Nose, Mouth, and Throat   External inspection of ears and nose: Normal     Otoscopic examination: Tympanic membranes translucent with normal light reflex  Canals patent without erythema  Nasal mucosa, septum, and turbinates: Normal without edema or erythema  Lips, teeth, and gums: Normal, good dentition  Oropharynx: Normal with no erythema, edema, exudate or lesions  Neck   Neck: Supple, symmetric, trachea midline, no masses  Thyroid: Normal, no thyromegaly  Pulmonary   Respiratory effort: No increased work of breathing or signs of respiratory distress  Auscultation of lungs: Clear to auscultation  Cardiovascular   Auscultation of heart: Normal rate and rhythm, normal S1 and S2, no murmurs  Examination of extremities for edema and/or varicosities: Normal     Abdomen   Abdomen: Non-tender, no masses  Lymphatic   Palpation of lymph nodes in neck: No lymphadenopathy  Musculoskeletal   Gait and station: Normal     Neurologic   Cranial nerves: Cranial nerves II-XII intact  Psychiatric   Orientation to person, place, and time: Normal     Mood and affect: Normal        Future Appointments    Date/Time Provider Specialty Site   05/01/2018 08:15 AM MEJIA Reddy  [address] [place] [address]   11/28/2017 09:00 AM MEJIA Pacheco   Obstetrics/Gynecology [facility]   [address]     Signatures   Electronically signed by : MEJIA [place] ; Oct 25 2017 12:24PM EST                       (Author)

## 2018-01-24 VITALS
BODY MASS INDEX: 24.28 KG/M2 | DIASTOLIC BLOOD PRESSURE: 82 MMHG | SYSTOLIC BLOOD PRESSURE: 122 MMHG | HEIGHT: 61 IN | WEIGHT: 128.6 LBS

## 2018-02-01 DIAGNOSIS — E61.1 IRON DEFICIENCY: ICD-10-CM

## 2018-04-26 ENCOUNTER — OFFICE VISIT (OUTPATIENT)
Dept: OBGYN CLINIC | Facility: CLINIC | Age: 53
End: 2018-04-26
Payer: COMMERCIAL

## 2018-04-26 VITALS — WEIGHT: 125.2 LBS | SYSTOLIC BLOOD PRESSURE: 128 MMHG | BODY MASS INDEX: 23.47 KG/M2 | DIASTOLIC BLOOD PRESSURE: 78 MMHG

## 2018-04-26 DIAGNOSIS — N63.20 LEFT BREAST LUMP: Primary | ICD-10-CM

## 2018-04-26 PROCEDURE — 99213 OFFICE O/P EST LOW 20 MIN: CPT | Performed by: OBSTETRICS & GYNECOLOGY

## 2018-04-26 NOTE — PROGRESS NOTES
For six days  patient noted a lump in her left breast  she denies any pain or discomfort  She admits to having similar  cyst in the past  At that time she had drainage by her gyn doctor  She also says she may have had spontaneous drainage for through the nipple as well in the past  Patient a normal mammogram in December of 2017    Physical exam:    Normal appearing of both breasts  No skin changes or nipple changes  Normal right breast exam no lumps noted  Left breast exam:  3-4 cm soft round mobile mass noted just lateral of the areola  No lymphadenopathy was noted    Impression:  Left breast mass    Plan:  Left breast ultrasound  Follow-up depending on the results of breast ultrasound

## 2018-04-26 NOTE — PROGRESS NOTES
Patient presents today for a problem visit  She has a left breast lump since Friday  She has a history of breast cysts with drainage

## 2018-04-27 ENCOUNTER — HOSPITAL ENCOUNTER (OUTPATIENT)
Dept: ULTRASOUND IMAGING | Facility: CLINIC | Age: 53
Discharge: HOME | End: 2018-04-27
Payer: COMMERCIAL

## 2018-04-27 ENCOUNTER — HOSPITAL ENCOUNTER (OUTPATIENT)
Dept: MAMMOGRAPHY | Facility: CLINIC | Age: 53
Discharge: HOME | End: 2018-04-27
Payer: COMMERCIAL

## 2018-04-27 DIAGNOSIS — N63.20 LEFT BREAST LUMP: ICD-10-CM

## 2018-04-27 DIAGNOSIS — IMO0002 LUMP: ICD-10-CM

## 2018-04-27 PROCEDURE — 76642 ULTRASOUND BREAST LIMITED: CPT

## 2018-04-27 PROCEDURE — 77065 DX MAMMO INCL CAD UNI: CPT

## 2018-04-27 PROCEDURE — G0279 TOMOSYNTHESIS, MAMMO: HCPCS

## 2018-05-01 ENCOUNTER — APPOINTMENT (OUTPATIENT)
Dept: LAB | Facility: MEDICAL CENTER | Age: 53
End: 2018-05-01
Payer: COMMERCIAL

## 2018-05-01 ENCOUNTER — OFFICE VISIT (OUTPATIENT)
Dept: FAMILY MEDICINE CLINIC | Facility: MEDICAL CENTER | Age: 53
End: 2018-05-01
Payer: COMMERCIAL

## 2018-05-01 ENCOUNTER — TELEPHONE (OUTPATIENT)
Dept: NEUROLOGY | Facility: CLINIC | Age: 53
End: 2018-05-01

## 2018-05-01 VITALS
DIASTOLIC BLOOD PRESSURE: 78 MMHG | HEART RATE: 60 BPM | BODY MASS INDEX: 23.5 KG/M2 | SYSTOLIC BLOOD PRESSURE: 124 MMHG | RESPIRATION RATE: 16 BRPM | WEIGHT: 125.4 LBS

## 2018-05-01 DIAGNOSIS — E61.1 IRON DEFICIENCY: Primary | ICD-10-CM

## 2018-05-01 DIAGNOSIS — R51.9 NONINTRACTABLE HEADACHE, UNSPECIFIED CHRONICITY PATTERN, UNSPECIFIED HEADACHE TYPE: ICD-10-CM

## 2018-05-01 DIAGNOSIS — R79.89 ABNORMAL CBC: ICD-10-CM

## 2018-05-01 DIAGNOSIS — R22.40 MASS OF LESSER TOE: ICD-10-CM

## 2018-05-01 DIAGNOSIS — E78.00 ELEVATED CHOLESTEROL: ICD-10-CM

## 2018-05-01 DIAGNOSIS — E61.1 IRON DEFICIENCY: ICD-10-CM

## 2018-05-01 PROBLEM — E78.5 HYPERLIPIDEMIA: Status: ACTIVE | Noted: 2017-10-25

## 2018-05-01 PROBLEM — H04.129 DRY EYE SYNDROME: Status: ACTIVE | Noted: 2017-10-25

## 2018-05-01 PROBLEM — M25.511 RIGHT SHOULDER PAIN: Status: ACTIVE | Noted: 2017-10-25

## 2018-05-01 LAB
BASOPHILS # BLD AUTO: 0.02 THOUSANDS/ΜL (ref 0–0.1)
BASOPHILS NFR BLD AUTO: 0 % (ref 0–1)
CHOLEST SERPL-MCNC: 226 MG/DL (ref 50–200)
EOSINOPHIL # BLD AUTO: 0.06 THOUSAND/ΜL (ref 0–0.61)
EOSINOPHIL NFR BLD AUTO: 1 % (ref 0–6)
ERYTHROCYTE [DISTWIDTH] IN BLOOD BY AUTOMATED COUNT: 14.3 % (ref 11.6–15.1)
FERRITIN SERPL-MCNC: 46 NG/ML (ref 8–388)
HCT VFR BLD AUTO: 43.3 % (ref 34.8–46.1)
HDLC SERPL-MCNC: 56 MG/DL (ref 40–60)
HGB BLD-MCNC: 14.2 G/DL (ref 11.5–15.4)
IRON SATN MFR SERPL: 38 %
IRON SERPL-MCNC: 140 UG/DL (ref 50–170)
LDLC SERPL CALC-MCNC: 141 MG/DL (ref 0–100)
LYMPHOCYTES # BLD AUTO: 1.97 THOUSANDS/ΜL (ref 0.6–4.47)
LYMPHOCYTES NFR BLD AUTO: 42 % (ref 14–44)
MCH RBC QN AUTO: 30.7 PG (ref 26.8–34.3)
MCHC RBC AUTO-ENTMCNC: 32.8 G/DL (ref 31.4–37.4)
MCV RBC AUTO: 94 FL (ref 82–98)
MONOCYTES # BLD AUTO: 0.36 THOUSAND/ΜL (ref 0.17–1.22)
MONOCYTES NFR BLD AUTO: 8 % (ref 4–12)
NEUTROPHILS # BLD AUTO: 2.29 THOUSANDS/ΜL (ref 1.85–7.62)
NEUTS SEG NFR BLD AUTO: 49 % (ref 43–75)
NRBC BLD AUTO-RTO: 0 /100 WBCS
PLATELET # BLD AUTO: 280 THOUSANDS/UL (ref 149–390)
PMV BLD AUTO: 11.7 FL (ref 8.9–12.7)
RBC # BLD AUTO: 4.63 MILLION/UL (ref 3.81–5.12)
TIBC SERPL-MCNC: 370 UG/DL (ref 250–450)
TRIGL SERPL-MCNC: 143 MG/DL
WBC # BLD AUTO: 4.71 THOUSAND/UL (ref 4.31–10.16)

## 2018-05-01 PROCEDURE — 83550 IRON BINDING TEST: CPT

## 2018-05-01 PROCEDURE — 83540 ASSAY OF IRON: CPT

## 2018-05-01 PROCEDURE — 99214 OFFICE O/P EST MOD 30 MIN: CPT | Performed by: FAMILY MEDICINE

## 2018-05-01 PROCEDURE — 85025 COMPLETE CBC W/AUTO DIFF WBC: CPT

## 2018-05-01 PROCEDURE — 36415 COLL VENOUS BLD VENIPUNCTURE: CPT

## 2018-05-01 PROCEDURE — 80061 LIPID PANEL: CPT

## 2018-05-01 PROCEDURE — 82728 ASSAY OF FERRITIN: CPT

## 2018-05-01 RX ORDER — FERROUS SULFATE 325(65) MG
325 TABLET ORAL
COMMUNITY
End: 2018-05-31

## 2018-05-01 NOTE — PROGRESS NOTES
Assessment/Plan:    No problem-specific Assessment & Plan notes found for this encounter  Diagnoses and all orders for this visit:    Iron deficiency  -     CBC and differential; Future  -     Iron Panel; Future  Abnormal CBC  Patient had an abnormal CBC previously showing and increase RDW which I suspected was due to her iron deficiency  Patient has been taking iron and therefore I will have her recheck a CBC and an iron panel to assess for resolution  If her levels have resolved then I may have her stop the iron supplementation  Elevated cholesterol  -     Lipid Panel with Direct LDL reflex; Future  Check labs to help assess if her increased exercise has lowered her cholesterol  Mass of lesser toe  -     Cancel: Ambulatory referral to Podiatry; Future  -     Ambulatory referral to Podiatry; Future  Will have patient see podiatry to assess for removal of the mass  Nonintractable headache, unspecified chronicity pattern, unspecified headache type  -     Cancel: Ambulatory referral to Neurology; Future  -     Ambulatory referral to Neurology; Future  Etiology unclear  Headaches seem to worsen when patient stops her iron tablets  I would like to try and stop her iron if her levels are normal   I suspect her headaches will then return  Will therefore have patient see Neurology for evaluation  Follow-up in six months or sooner if needed  Subjective:      Patient ID: Walter Zarate is a 47 y o  female  Patient presents for follow-up  She has iron deficiency  She currently takes iron three tablets daily  She states that ever since she started taking the iron it has helped improve her headaches  However, if she skips a dose of the iron her headaches do return  She is not sure if this is normal or something else is going on  She did have a colonoscopy which showed one polyp and she is due for repeat colonoscopy in five years    Headaches continue to be on the top of her head and her described as a burning sensation  She did also see the eye doctor as well  She was told it is not her eyes  Patient also has high cholesterol  She is not currently on any medication for  She did start to exercise more and exercises at least 3 times per week to help lower her cholesterol  She is not interested in starting cholesterol medication at this time  She does have a mass of her toe that she is concerned about  It is the toe on her left foot  Specifically it is the 2nd toe  She has had the mass for about two years  She would like it removed as it does cause pain  The following portions of the patient's history were reviewed and updated as appropriate: allergies, current medications, past family history, past medical history, past social history, past surgical history and problem list     Review of Systems   Constitutional: Negative for fever  Respiratory: Negative for shortness of breath  Cardiovascular: Negative for chest pain  Objective:      /78 (BP Location: Left arm, Patient Position: Sitting, Cuff Size: Adult)   Pulse 60   Resp 16   Wt 56 9 kg (125 lb 6 4 oz)   BMI 23 50 kg/m²          Physical Exam   Constitutional: She appears well-developed and well-nourished  Cardiovascular: Normal rate, regular rhythm and normal heart sounds      Pulmonary/Chest: Effort normal and breath sounds normal    Musculoskeletal:        Feet:

## 2018-05-02 ENCOUNTER — TELEPHONE (OUTPATIENT)
Dept: FAMILY MEDICINE CLINIC | Facility: MEDICAL CENTER | Age: 53
End: 2018-05-02

## 2018-05-02 NOTE — TELEPHONE ENCOUNTER
----- Message from Arlene Farooq DO sent at 5/1/2018  7:41 PM EDT -----  Labs reviewed  Cholesterol has improved  Patient's exercise is working  Keep up the good work  Iron levels have improved and her blood count abnormality has resolved  Patient can stop the iron supplementation if she wants  However, this might cause her to have more headaches as we discussed in the office  I do recommend she try stopping the iron to see what happens for few days  Definitely make an appointment with the neurologist for evaluation

## 2018-05-30 ENCOUNTER — ANESTHESIA EVENT (OUTPATIENT)
Dept: PERIOP | Facility: AMBULARY SURGERY CENTER | Age: 53
End: 2018-05-30
Payer: COMMERCIAL

## 2018-05-30 ENCOUNTER — TELEPHONE (OUTPATIENT)
Dept: FAMILY MEDICINE CLINIC | Facility: MEDICAL CENTER | Age: 53
End: 2018-05-30

## 2018-05-30 NOTE — TELEPHONE ENCOUNTER
If it form needs to be completed patient will need to come in for a preoperative evaluation  Please obtain all of the necessary information

## 2018-05-30 NOTE — TELEPHONE ENCOUNTER
Having foot surgery on Monday  Has a form to be filled out  Last seen 5/1  No PATs  Do you need to see her?

## 2018-05-31 NOTE — PRE-PROCEDURE INSTRUCTIONS
Pre-Surgery Instructions:   Medication Instructions    cycloSPORINE (RESTASIS) 0 05 % ophthalmic emulsion Instructed patient per Anesthesia Guidelines   ibuprofen (MOTRIN) 200 mg tablet Patient was instructed by Physician and understands   loteprednol etabonate (LOTEMAX) 0 5 % ophthalmic suspension Instructed patient per Anesthesia Guidelines   multivitamin [facility]) TABS Patient was instructed by Physician and understands  Pre op and bathing instructions reviewed  Pt will get hibiclens

## 2018-06-01 ENCOUNTER — OFFICE VISIT (OUTPATIENT)
Dept: FAMILY MEDICINE CLINIC | Facility: MEDICAL CENTER | Age: 53
End: 2018-06-01

## 2018-06-01 VITALS
DIASTOLIC BLOOD PRESSURE: 68 MMHG | RESPIRATION RATE: 16 BRPM | WEIGHT: 124.4 LBS | SYSTOLIC BLOOD PRESSURE: 118 MMHG | BODY MASS INDEX: 22.75 KG/M2 | HEART RATE: 64 BPM

## 2018-06-01 DIAGNOSIS — R22.40 MASS OF LESSER TOE: ICD-10-CM

## 2018-06-01 DIAGNOSIS — Z01.818 PREOP EXAMINATION: Primary | ICD-10-CM

## 2018-06-01 DIAGNOSIS — H04.129 DRY EYE SYNDROME, UNSPECIFIED LATERALITY: ICD-10-CM

## 2018-06-01 DIAGNOSIS — Z87.898 HISTORY OF ANESTHESIA REACTION: ICD-10-CM

## 2018-06-01 DIAGNOSIS — R51.9 NONINTRACTABLE HEADACHE, UNSPECIFIED CHRONICITY PATTERN, UNSPECIFIED HEADACHE TYPE: ICD-10-CM

## 2018-06-01 DIAGNOSIS — E78.00 ELEVATED CHOLESTEROL: ICD-10-CM

## 2018-06-01 DIAGNOSIS — Z88.0 PENICILLIN ALLERGY: ICD-10-CM

## 2018-06-01 PROBLEM — E61.1 IRON DEFICIENCY: Status: RESOLVED | Noted: 2017-11-03 | Resolved: 2018-06-01

## 2018-06-01 PROBLEM — R79.89 ABNORMAL CBC: Status: RESOLVED | Noted: 2017-11-01 | Resolved: 2018-06-01

## 2018-06-01 PROCEDURE — 99243 OFF/OP CNSLTJ NEW/EST LOW 30: CPT | Performed by: FAMILY MEDICINE

## 2018-06-01 NOTE — PROGRESS NOTES
Assessment/Plan:    No problem-specific Assessment & Plan notes found for this encounter  Diagnoses and all orders for this visit:    Preop examination  No preoperative testing was ordered  EKG is not indicated as patient is not having any cardiac symptoms  Chest x-ray is not indicated as patient does not have active pulmonary disease  Labs are not indicated prior to surgery at this time either  Patient did have a CBC and iron panel on 5/1/2018 due to history of iron deficiency and both were normal at that time  No additional labs at this time  Patient has stop taking ibuprofen  Mass of lesser toe  Left 2nd toe  Scheduled for surgical removal by Dr Rajiv Landaverde at [facility] on 6/4/2018  Nonintractable headache, unspecified chronicity pattern, unspecified headache type  Patient's headaches persist   She does have an upcoming appointment Neurology  This should not prevent patient from having surgery  Elevated cholesterol  Diet controlled  Patient is on no medication  This should not prevent patient from having surgery  Dry eye syndrome, unspecified laterality  Patient is on to eyedrops which I recommended she continue  Penicillin allergy  Please note patient has a penicillin allergy  History of anesthesia reaction  Please note that patient experiences vomiting after coming out of anesthesia  Patient is medically optimized for proposed surgery  Follow-up as previously scheduled in five months or sooner if needed  Subjective:      Patient ID: Caroline Arteaga is a 47 y o  female  Patient presents for preop evaluation  She will be having surgery with Dr Rajiv Landaverde on 6/4/2018 at [facility] for removal of a mass from her left 2nd toe  She has had surgery in the past   The only complication from anesthesia that she has is that she vomits afterwards  She does have a penicillin allergy  No history of heart attack or stroke      She has no shortness of breath or chest pain with walking four blocks or walking up two flights of stairs  She does snore at times  She does not stop breathing while she sleeps  There is no excess fatigue during the day  She does not have high blood pressure  BMI is less than 35  Age is over 48  Neck circumference is less than 16 in  Patient is a female  Patient is low risk for sleep apnea  She has stop taking ibuprofen  She continues to have headaches regularly  She does have an appointment with Neurology on 7/6/2018  She has elevated cholesterol that is diet controlled  She has dry syndrome for which she takes eyedrops  No acute concerns today  The following portions of the patient's history were reviewed and updated as appropriate: allergies, current medications, past family history, past medical history, past social history, past surgical history and problem list     Review of Systems   Constitutional: Negative for fever  Respiratory: Negative for shortness of breath  Cardiovascular: Negative for chest pain  Gastrointestinal: Negative for abdominal pain and blood in stool  Genitourinary: Negative for dysuria  Musculoskeletal: Negative for arthralgias and myalgias  Skin: Negative for rash  Neurological: Negative for headaches  Objective:      /68 (BP Location: Left arm, Patient Position: Sitting, Cuff Size: Adult)   Pulse 64   Resp 16   Wt 56 4 kg (124 lb 6 4 oz)   BMI 22 75 kg/m²          Physical Exam   Constitutional: She is oriented to person, place, and time  Vital signs are normal  She appears well-developed and well-nourished  HENT:   Head: Normocephalic and atraumatic     Right Ear: Tympanic membrane, external ear and ear canal normal    Left Ear: Tympanic membrane, external ear and ear canal normal    Nose: Nose normal    Mouth/Throat: Uvula is midline, oropharynx is clear and moist and mucous membranes are normal    Eyes: Conjunctivae, EOM and lids are normal  Pupils are equal, round, and reactive to light  Neck: Trachea normal  Neck supple  No thyromegaly present  Cardiovascular: Normal rate, regular rhythm, S1 normal and S2 normal     No murmur heard  Pulmonary/Chest: Effort normal and breath sounds normal    Lymphadenopathy:     She has no cervical adenopathy  Neurological: She is alert and oriented to person, place, and time  Skin: Skin is warm and dry     Psychiatric: Her speech is normal and behavior is normal  Judgment and thought content normal  Cognition and memory are normal

## 2018-06-01 NOTE — LETTER
June 1, 2018     Angel Roe, [address] [facility] [address] [facility]    Patient: Kenzie Jenkins   YOB: 1965   Date of Visit: 6/1/2018       Dear Dr Quang Villalobos: Thank you for referring Kenzie Jenkins to me for evaluation  Below are my notes for this consultation  If you have questions, please do not hesitate to call me  I look forward to following your patient along with you  Sincerely,        Marjorie Hitchcock DO        CC: No Recipients  Marjorie Hitchcock DO  6/1/2018 11:23 AM  Sign at close encounter  Assessment/Plan:    No problem-specific Assessment & Plan notes found for this encounter  Diagnoses and all orders for this visit:    Preop examination  No preoperative testing was ordered  EKG is not indicated as patient is not having any cardiac symptoms  Chest x-ray is not indicated as patient does not have active pulmonary disease  Labs are not indicated prior to surgery at this time either  Patient did have a CBC and iron panel on 5/1/2018 due to history of iron deficiency and both were normal at that time  No additional labs at this time  Patient has stop taking ibuprofen  Mass of lesser toe  Left 2nd toe  Scheduled for surgical removal by Dr Quang Villalobos at [facility] on 6/4/2018  Nonintractable headache, unspecified chronicity pattern, unspecified headache type  Patient's headaches persist   She does have an upcoming appointment Neurology  This should not prevent patient from having surgery  Elevated cholesterol  Diet controlled  Patient is on no medication  This should not prevent patient from having surgery  Dry eye syndrome, unspecified laterality  Patient is on to eyedrops which I recommended she continue  Penicillin allergy  Please note patient has a penicillin allergy  History of anesthesia reaction  Please note that patient experiences vomiting after coming out of anesthesia      Patient is medically optimized for proposed surgery  Follow-up as previously scheduled in five months or sooner if needed  Subjective:      Patient ID: Rossy Stout is a 47 y o  female  Patient presents for preop evaluation  She will be having surgery with Dr Reymundo Keller on 6/4/2018 at [address] [place] [address] for removal of a mass from her left 2nd toe  She has had surgery in the past   The only complication from anesthesia that she has is that she vomits afterwards  She does have a penicillin allergy  No history of heart attack or stroke  She has no shortness of breath or chest pain with walking four blocks or walking up two flights of stairs  She does snore at times  She does not stop breathing while she sleeps  There is no excess fatigue during the day  She does not have high blood pressure  BMI is less than 35  Age is over 48  Neck circumference is less than 16 in  Patient is a female  Patient is low risk for sleep apnea  She has stop taking ibuprofen  She continues to have headaches regularly  She does have an appointment with Neurology on 7/6/2018  She has elevated cholesterol that is diet controlled  She has dry syndrome for which she takes eyedrops  No acute concerns today  The following portions of the patient's history were reviewed and updated as appropriate: allergies, current medications, past family history, past medical history, past social history, past surgical history and problem list     Review of Systems   Constitutional: Negative for fever  Respiratory: Negative for shortness of breath  Cardiovascular: Negative for chest pain  Gastrointestinal: Negative for abdominal pain and blood in stool  Genitourinary: Negative for dysuria  Musculoskeletal: Negative for arthralgias and myalgias  Skin: Negative for rash  Neurological: Negative for headaches           Objective:      /68 (BP Location: Left arm, Patient Position: Sitting, Cuff Size: Adult)   Pulse 64   Resp 16   Wt 56 4 kg (124 lb 6 4 oz)   BMI 22 75 kg/m²           Physical Exam   Constitutional: She is oriented to person, place, and time  Vital signs are normal  She appears well-developed and well-nourished  HENT:   Head: Normocephalic and atraumatic  Right Ear: Tympanic membrane, external ear and ear canal normal    Left Ear: Tympanic membrane, external ear and ear canal normal    Nose: Nose normal    Mouth/Throat: Uvula is midline, oropharynx is clear and moist and mucous membranes are normal    Eyes: Conjunctivae, EOM and lids are normal  Pupils are equal, round, and reactive to light  Neck: Trachea normal  Neck supple  No thyromegaly present  Cardiovascular: Normal rate, regular rhythm, S1 normal and S2 normal     No murmur heard  Pulmonary/Chest: Effort normal and breath sounds normal    Lymphadenopathy:     She has no cervical adenopathy  Neurological: She is alert and oriented to person, place, and time  Skin: Skin is warm and dry     Psychiatric: Her speech is normal and behavior is normal  Judgment and thought content normal  Cognition and memory are normal

## 2018-06-04 ENCOUNTER — ANESTHESIA (OUTPATIENT)
Dept: PERIOP | Facility: AMBULARY SURGERY CENTER | Age: 53
End: 2018-06-04
Payer: COMMERCIAL

## 2018-06-04 ENCOUNTER — HOSPITAL ENCOUNTER (OUTPATIENT)
Facility: AMBULARY SURGERY CENTER | Age: 53
Setting detail: OUTPATIENT SURGERY
Discharge: HOME | End: 2018-06-04
Attending: PODIATRIST | Admitting: PODIATRIST
Payer: COMMERCIAL

## 2018-06-04 VITALS
HEIGHT: 62 IN | RESPIRATION RATE: 20 BRPM | SYSTOLIC BLOOD PRESSURE: 107 MMHG | DIASTOLIC BLOOD PRESSURE: 58 MMHG | TEMPERATURE: 98.2 F | BODY MASS INDEX: 22.08 KG/M2 | HEART RATE: 68 BPM | OXYGEN SATURATION: 100 % | WEIGHT: 120 LBS

## 2018-06-04 DIAGNOSIS — Z98.890 S/P FOOT SURGERY, LEFT: Primary | ICD-10-CM

## 2018-06-04 DIAGNOSIS — R22.42 LOCALIZED SWELLING, MASS AND LUMP, LEFT LOWER LIMB: ICD-10-CM

## 2018-06-04 LAB — EXT PREGNANCY TEST URINE: NEGATIVE

## 2018-06-04 PROCEDURE — 88307 TISSUE EXAM BY PATHOLOGIST: CPT | Performed by: PATHOLOGY

## 2018-06-04 PROCEDURE — 88341 IMHCHEM/IMCYTCHM EA ADD ANTB: CPT | Performed by: PATHOLOGY

## 2018-06-04 PROCEDURE — 81025 URINE PREGNANCY TEST: CPT | Performed by: ANESTHESIOLOGY

## 2018-06-04 PROCEDURE — 88342 IMHCHEM/IMCYTCHM 1ST ANTB: CPT | Performed by: PATHOLOGY

## 2018-06-04 RX ORDER — MIDAZOLAM HYDROCHLORIDE 1 MG/ML
INJECTION INTRAMUSCULAR; INTRAVENOUS AS NEEDED
Status: DISCONTINUED | OUTPATIENT
Start: 2018-06-04 | End: 2018-06-04 | Stop reason: SURG

## 2018-06-04 RX ORDER — ONDANSETRON 2 MG/ML
INJECTION INTRAMUSCULAR; INTRAVENOUS AS NEEDED
Status: DISCONTINUED | OUTPATIENT
Start: 2018-06-04 | End: 2018-06-04 | Stop reason: SURG

## 2018-06-04 RX ORDER — PROPOFOL 10 MG/ML
INJECTION, EMULSION INTRAVENOUS CONTINUOUS PRN
Status: DISCONTINUED | OUTPATIENT
Start: 2018-06-04 | End: 2018-06-04 | Stop reason: SURG

## 2018-06-04 RX ORDER — ACETAMINOPHEN AND CODEINE PHOSPHATE 300; 30 MG/1; MG/1
1 TABLET ORAL EVERY 6 HOURS PRN
Qty: 10 TABLET | Refills: 0 | Status: SHIPPED | OUTPATIENT
Start: 2018-06-04 | End: 2018-06-14

## 2018-06-04 RX ORDER — LIDOCAINE HYDROCHLORIDE 10 MG/ML
INJECTION, SOLUTION EPIDURAL; INFILTRATION; INTRACAUDAL; PERINEURAL AS NEEDED
Status: DISCONTINUED | OUTPATIENT
Start: 2018-06-04 | End: 2018-06-04 | Stop reason: HOSPADM

## 2018-06-04 RX ORDER — BUPIVACAINE HYDROCHLORIDE 5 MG/ML
INJECTION, SOLUTION PERINEURAL AS NEEDED
Status: DISCONTINUED | OUTPATIENT
Start: 2018-06-04 | End: 2018-06-04 | Stop reason: HOSPADM

## 2018-06-04 RX ORDER — MAGNESIUM HYDROXIDE 1200 MG/15ML
LIQUID ORAL AS NEEDED
Status: DISCONTINUED | OUTPATIENT
Start: 2018-06-04 | End: 2018-06-04 | Stop reason: HOSPADM

## 2018-06-04 RX ORDER — CLINDAMYCIN PHOSPHATE 150 MG/ML
INJECTION, SOLUTION INTRAVENOUS AS NEEDED
Status: DISCONTINUED | OUTPATIENT
Start: 2018-06-04 | End: 2018-06-04 | Stop reason: SURG

## 2018-06-04 RX ORDER — FENTANYL CITRATE 50 UG/ML
INJECTION, SOLUTION INTRAMUSCULAR; INTRAVENOUS AS NEEDED
Status: DISCONTINUED | OUTPATIENT
Start: 2018-06-04 | End: 2018-06-04 | Stop reason: SURG

## 2018-06-04 RX ADMIN — FENTANYL CITRATE 50 MCG: 50 INJECTION, SOLUTION INTRAMUSCULAR; INTRAVENOUS at 14:17

## 2018-06-04 RX ADMIN — PROPOFOL 140 MCG/KG/MIN: 10 INJECTION, EMULSION INTRAVENOUS at 14:01

## 2018-06-04 RX ADMIN — CLINDAMYCIN PHOSPHATE 900 MG: 150 INJECTION, SOLUTION INTRAMUSCULAR; INTRAVENOUS at 13:59

## 2018-06-04 RX ADMIN — DEXAMETHASONE SODIUM PHOSPHATE 4 MG: 10 INJECTION INTRAMUSCULAR; INTRAVENOUS at 14:03

## 2018-06-04 RX ADMIN — FENTANYL CITRATE 50 MCG: 50 INJECTION, SOLUTION INTRAMUSCULAR; INTRAVENOUS at 14:00

## 2018-06-04 RX ADMIN — MIDAZOLAM HYDROCHLORIDE 2 MG: 1 INJECTION, SOLUTION INTRAMUSCULAR; INTRAVENOUS at 13:57

## 2018-06-04 RX ADMIN — ONDANSETRON 4 MG: 2 INJECTION INTRAMUSCULAR; INTRAVENOUS at 14:03

## 2018-06-04 NOTE — ANESTHESIA PREPROCEDURE EVALUATION
Review of Systems/Medical History  Patient summary reviewed  Chart reviewed  No history of anesthetic complications     Cardiovascular  EKG reviewed, Exercise tolerance (METS): >4,  Hyperlipidemia,    Pulmonary  Negative pulmonary ROS Asthma ,        GI/Hepatic  Negative GI/hepatic ROS     Comment: Iron deficiency     Negative  ROS Kidney stones,        Endo/Other  Negative endo/other ROS      GYN  Negative gynecology ROS          Hematology  Negative hematology ROS Anemia ,     Musculoskeletal  Negative musculoskeletal ROS        Neurology  Negative neurology ROS   Headaches,    Psychology   Negative psychology ROS              Physical Exam    Airway    Mallampati score: II         Dental   No notable dental hx     Cardiovascular      Pulmonary      Other Findings      Lab Results   Component Value Date    WBC 4 71 05/01/2018    HGB 14 2 05/01/2018     05/01/2018     Lab Results   Component Value Date     10/31/2017    K 4 2 10/31/2017    BUN 13 10/31/2017    CREATININE 0 75 10/31/2017    GLUCOSE 93 10/04/2016     No results found for: PTT   No results found for: INR      No results found for: HGBA1C      Anesthesia Plan  ASA Score- 2     Anesthesia Type- IV sedation with anesthesia with ASA Monitors  Additional Monitors:   Airway Plan:     Comment: MEJIA Aguirre , have personally seen and evaluated the patient prior to anesthetic care  I have reviewed the pre-anesthetic record, and other medical records if appropriate to the anesthetic care  If a CRNA is involved in the case, I have reviewed the CRNA assessment, if present, and agree  Risks/benefits and alternatives discussed with patient including possible PONV, sore throat, and possibility of rare anesthetic and surgical emergencies        Plan Factors-    Induction-     Postoperative Plan-     Informed Consent- Anesthetic plan and risks discussed with patient  I personally reviewed this patient with the CRNA  Discussed and agreed on the Anesthesia Plan with the CRNA  Asaf Sandoval

## 2018-06-04 NOTE — DISCHARGE SUMMARY
Discharge Summary Outpatient Procedure Podiatry -   Kacey Duncan 47 y o  female MRN: [id number]  Unit/Bed#: OR POOL Encounter: [id number]    Admission Date: 6/4/2018     Admitting Diagnosis: Localized swelling, mass and lump, left lower limb [R22 42]    Discharge Diagnosis: same    Procedures Performed: FOOT SOFT TISSUE MASS REMOVAL:  left foot 2nd digit     Complications: none    Condition at Discharge: stable    Discharge instructions/Information to patient and family:   See after visit summary for information provided to patient and family  Provisions for Follow-Up Care/Important appointments:  See after visit summary for information related to follow-up care and any pertinent home health orders  Discharge Medications:  See after visit summary for reconciled discharge medications provided to patient and family

## 2018-06-04 NOTE — PROGRESS NOTES
H and P reviewed after examining the patient and I find no changes in the patient condition since the H and P has been written      Selena Agee MD  June 4, 2018  1:16 PM

## 2018-06-04 NOTE — OP NOTE
OPERATIVE REPORT  PATIENT NAME: Colette Grimaldo    :  1965  MRN: [id number]  Pt Location: [facility] OR ROOM 06    SURGERY DATE: 2018    Surgeon(s) and Role:     * Evi Martell DPM - Primary     * Brody Oneil DPM - Assisting    Preop Diagnosis:  Localized swelling, mass and lump, left lower limb [R22 42]    Post-Op Diagnosis Codes:     * Localized swelling, mass and lump, left lower limb [R22 42]    Procedure(s) (LRB):  FOOT SOFT TISSUE MASS REMOVAL (Left)    Specimen(s):  ID Type Source Tests Collected by Time Destination   1 : left foot  2nd toe soft tissue mass Tissue Foot, Left TISSUE EXAM Evi Martell DPM 2018 1356        Estimated Blood Loss:   Minimal    Drains: none     Anesthesia Type:   IV Sedation with Anesthesia; 10cc of 1:1 mix of 1% lidocaine and 0 5% marcaine plain     Hemostasis: PAT at 250mmHg for 10 minutes     Materials: 4-0 nylon     Operative Indications:  Localized swelling, mass and lump, left lower limb [R22 42]    Operative Findings:  Cyst measuring 1cm x0 5cm with intertwined vein on dorsal aspect of it  Complications:   None    Procedure and Technique:  Under mild sedation, the patient was brought into the operating room and placed on the operating room table in the supine position  A pneumatic ankle tourniquet was then placed around the patient's left ankle with ample webril padding  A time out was performed to confirm the correct patient, procedure and site with all parties in agreement  Following IV sedation, local anesthetic was obtained about the patient's left foot and injection was performed consisting of 10ml of 1% Lidocaine and 0 5% Bupivacaine in a 1:1 mixture  The foot was then scrubbed, prepped and draped in the usual aseptic manner  Left foot was elevated then the pneumatic ankle tourniquet was inflated  Attention was then directed to the dorsal aspect of the left foot 2nd digit where a soft tissue mass was identified   Incision was made over the mass  Then this incision was deepened via sharp and blunt dissection as to excise the soft tissue mass which appeared to look like a cyst with intertwined vein structure along with it  Excised mass measured to be 1cm x0 5cm; this was then sent to pathology for examination  Surgical site was then irrigated with copious amounts of sterile saline  Skin edges were then re-approximated and closed with 4-0 nylon in horizontal mattress suture technique  Incision site was dressed with xeroform, gauze, jessee, and ACE  Tourniquet was deflated at this time and normal hyperemic response was noted to the digits  Patient tolerated the procedure and anesthesia well and then was transported to the PACU with VSS       Patient Disposition:  PACU  and hemodynamically stable    SIGNATURE: Jimmy Porras DPM  DATE: June 4, 2018  TIME: 2:32 PM

## 2018-06-04 NOTE — DISCHARGE INSTRUCTIONS
Please keep dressings dry and intact to your left foot until your follow up appointment with Dr Skyler Myers  You may take tylenol #3 as needed for severe pain  You may weight bear as tolerated with surgical shoe on the left foot

## 2018-06-19 ENCOUNTER — OFFICE VISIT (OUTPATIENT)
Dept: FAMILY MEDICINE CLINIC | Facility: MEDICAL CENTER | Age: 53
End: 2018-06-19
Payer: COMMERCIAL

## 2018-06-19 VITALS
BODY MASS INDEX: 23.05 KG/M2 | TEMPERATURE: 98.8 F | DIASTOLIC BLOOD PRESSURE: 64 MMHG | RESPIRATION RATE: 16 BRPM | SYSTOLIC BLOOD PRESSURE: 118 MMHG | HEART RATE: 64 BPM | WEIGHT: 126 LBS

## 2018-06-19 DIAGNOSIS — R22.31 MASS OF ARM, RIGHT: Primary | ICD-10-CM

## 2018-06-19 PROCEDURE — 99213 OFFICE O/P EST LOW 20 MIN: CPT | Performed by: FAMILY MEDICINE

## 2018-06-19 RX ORDER — NABUMETONE 500 MG/1
TABLET, FILM COATED ORAL
Qty: 30 TABLET | Refills: 0 | Status: SHIPPED | OUTPATIENT
Start: 2018-06-19 | End: 2019-01-02

## 2018-06-19 NOTE — PROGRESS NOTES
Assessment/Plan:    No problem-specific Assessment & Plan notes found for this encounter  Diagnoses and all orders for this visit:    Mass of arm, right  -     nabumetone (RELAFEN) 500 mg tablet; Take 1 tab PO BID X 5 days then PRN pain  The mass is either due to a superficial thrombophlebitis or a small hematoma  I will have patient use a prescription anti inflammatory twice daily for five days and then as needed  I recommended she apply heat to the area as well for the next five days, at least 3 times daily and 10-15 minute each time  If the lump persists by next week we will get an ultrasound for evaluation  Follow-up in one month if symptoms persist or sooner if needed  Subjective:      Patient ID: Brian Hernandez is a 47 y o  female  Patient presents with a two week history of a painful lump near her IV site that she had placed for her recent foot surgery  The lump is located on her right arm  The lump is getting bigger  Prior to that the entire area was bruised but that has since resolved  There is no redness  There is no swelling  Patient was seen by a podiatrist who recommended she follow up with her PCP for this problem  The following portions of the patient's history were reviewed and updated as appropriate: allergies, current medications and problem list     Review of Systems   Constitutional: Negative for fever  Respiratory: Negative for shortness of breath  Cardiovascular: Negative for chest pain  Objective:      /64 (BP Location: Left arm, Patient Position: Sitting, Cuff Size: Adult)   Pulse 64   Temp 98 8 °F (37 1 °C) (Oral)   Resp 16   Wt 57 2 kg (126 lb)   BMI 23 05 kg/m²          Physical Exam   Constitutional: She appears well-developed and well-nourished     Skin:

## 2018-07-03 ENCOUNTER — OFFICE VISIT (OUTPATIENT)
Dept: OBGYN CLINIC | Facility: MEDICAL CENTER | Age: 53
End: 2018-07-03
Payer: COMMERCIAL

## 2018-07-03 VITALS — WEIGHT: 125.4 LBS | DIASTOLIC BLOOD PRESSURE: 70 MMHG | BODY MASS INDEX: 22.94 KG/M2 | SYSTOLIC BLOOD PRESSURE: 114 MMHG

## 2018-07-03 DIAGNOSIS — N63.0 BREAST LUMP: Primary | ICD-10-CM

## 2018-07-03 PROCEDURE — 99213 OFFICE O/P EST LOW 20 MIN: CPT | Performed by: OBSTETRICS & GYNECOLOGY

## 2018-07-03 NOTE — PROGRESS NOTES
Problem List Items Addressed This Visit        Other    Breast lump - Primary      Her referral to breast specialist for a growing left breast mass         Relevant Orders    Ambulatory referral to Surgical Oncology                   Patient is coming back for left breast mass which has increased in size since the last visit also since she had a imaging including breast ultrasound and diagnostic mammogram the results of that showed a complicated cyst   patient noticed growth and more tenderness in the left breast area at the same location as before     physical exam:      4 cm firm mass just lateral of the left  areola,  Just underneath the skin   no skin changes no nipple discharge no other breast changes

## 2018-07-06 ENCOUNTER — OFFICE VISIT (OUTPATIENT)
Dept: NEUROLOGY | Facility: CLINIC | Age: 53
End: 2018-07-06
Payer: COMMERCIAL

## 2018-07-06 ENCOUNTER — TELEPHONE (OUTPATIENT)
Dept: FAMILY MEDICINE CLINIC | Facility: MEDICAL CENTER | Age: 53
End: 2018-07-06

## 2018-07-06 VITALS
DIASTOLIC BLOOD PRESSURE: 88 MMHG | BODY MASS INDEX: 23.19 KG/M2 | HEART RATE: 59 BPM | WEIGHT: 126 LBS | HEIGHT: 62 IN | SYSTOLIC BLOOD PRESSURE: 128 MMHG

## 2018-07-06 DIAGNOSIS — R51.9 NONINTRACTABLE HEADACHE, UNSPECIFIED CHRONICITY PATTERN, UNSPECIFIED HEADACHE TYPE: Primary | ICD-10-CM

## 2018-07-06 PROCEDURE — 99204 OFFICE O/P NEW MOD 45 MIN: CPT | Performed by: PSYCHIATRY & NEUROLOGY

## 2018-07-06 RX ORDER — AMITRIPTYLINE HYDROCHLORIDE 10 MG/1
10 TABLET, FILM COATED ORAL
Qty: 30 TABLET | Refills: 1 | Status: SHIPPED | OUTPATIENT
Start: 2018-07-06 | End: 2019-01-02

## 2018-07-06 NOTE — TELEPHONE ENCOUNTER
Saw neurology today  He wants her to start a new med, but he told her to check with you first before doing so to make sure it's OK with you  She didn't know the name of the med  It will be in her chart, I guess  Can you advise

## 2018-07-06 NOTE — PROGRESS NOTES
Bouchra Martinez is a 47 y o  female  Chief Complaint   Patient presents with    Migraine       Assessment:  1  Nonintractable headache, unspecified chronicity pattern, unspecified headache type        Plan:    Discussion:  Differential diagnosis discussed with the patient hidden including migraine headaches versus other etiology, would recommend an MRI scan of the brain, blood work and patient to call me after the test to discuss the results, we discussed different medication options including Topamax and Elavil, patient is agreeable to go on Elavil 10 milligrams at nighttime, side effects of the medication discussed with the patient in detail, she is going to discuss with family physician prior to taking the medication, she was advised to avoid migraine triggers which we discussed in detail, she was also advised to call me and stop if experiences any side effects of the medication, to go to the hospital if has any worsening symptoms and call me, avoid excessive use of over-the-counter medication, and to see me back in 2 months and follow up with her other physicians  Subjective:    HPI   Patient is here for evaluation of headaches for the last 6 months, she describes her headaches mostly on top of the head burning to throbbing in nature associated with photophobia about 5 on 10 relieved with Advil and sometimes lying in a dark room, it could last for about 1 hour, they can occur 3 to 4 times a week, no nausea vomiting, no vision difficulty, no motor or sensory symptoms in upper or lower extremity, no fever she has some issues with her sleep and unable to sleep restfully, no other neurological complaints      Vitals:    07/06/18 0821   Weight: 57 2 kg (126 lb)   Height: 5' 2 25" (1 581 m)       Current Medications    Current Outpatient Prescriptions:     cycloSPORINE (RESTASIS) 0 05 % ophthalmic emulsion, 1 drop 2 (two) times a day, Disp: , Rfl:     loteprednol etabonate (LOTEMAX) 0 5 % ophthalmic suspension, 1 drop 4 (four) times a day, Disp: , Rfl:     multivitamin (THERAGRAN) TABS, Take 1 tablet by mouth daily, Disp: , Rfl:     nabumetone (RELAFEN) 500 mg tablet, Take 1 tab PO BID X 5 days then PRN pain , Disp: 30 tablet, Rfl: 0      Allergies  Penicillins    Past Medical History  Past Medical History:   Diagnosis Date    Anemia     iron defficiency    ASCUS with positive high risk HPV cervical 2015    Asthma     last assessed - 25Oct2017    Colon polyps     Dysfunctional uterine bleeding     last assessed - 28Apr2015    History of colonic polyps     Resolved - 2017    HPV (human papilloma virus) anogenital infection     Hyperlipidemia     diet controlled    Kidney stone     Normal delivery     1984 son 29 weeks, 1985 son 28 weeks    PONV (postoperative nausea and vomiting)          Past Surgical History:  Past Surgical History:   Procedure Laterality Date    BONE BIOPSY Left 6/4/2018    Procedure: FOOT SOFT TISSUE MASS REMOVAL;  Surgeon: Rashid Hanna DPM;  Location: [facility] MAIN OR;  Service: Podiatry    BREAST BIOPSY Left     Percutaneous needle core; last assessed - [id number]    BREAST SURGERY      COLONOSCOPY      COLPOSCOPY      COLPOSCOPY W/ BIOPSY / CURETTAGE  04/28/2015    Colposcopy Cervix with Biopsy(s) - COLPO=neg; last assessed - 28Apr2015    DILATION AND CURETTAGE OF UTERUS      ENDOMETRIAL BIOPSY  04/28/2015    by suction; EB/COLPO = Negative Results; last assessed - 30Apr2015    LITHOTRIPSY      Renal    [place] COLONOSCOPY FLX DX W/COLLJ SPEC WHEN PFRMD N/A 12/5/2017    Procedure: COLONOSCOPY;  Surgeon: Jaime Noel MD;  Location: [facility]; Service: Gastroenterology; Complete Colonoscopy    TUBAL LIGATION           Family History:  Family History   Problem Relation Age of Onset    Arthritis Mother     Hypertension Mother     Diabetes Father        Social History:   reports that she has never smoked   She has never used smokeless tobacco  She reports that she does not drink alcohol or use drugs  I have reviewed the past medical history, surgical history, social and family history, current medications, allergies vitals, review of systems, and updated this information as appropriate today  Objective:    Physical Exam    Neurological Exam  Patient is alert awake oriented, high functions are intact, speech is fluent  No evidence of any aphasia or dysarthria  Cranial nerve examination reveals visual fields are full to threat, pupils equal and reactive, extraocular movements intact, fundi showed sharp disc margins, sensation in the V1 V2 V3 distribution is symmetric, no obvious facial asymmetry noted, tongue is midline and gag is adequate  Hearing is intact bilaterally, shoulder shrug is intact bilaterally  Motor examination reveals normal tone and bulk, no evidence of any drift to the outstretched extremities, strength is 5/5 preserved bilaterally in both upper and lower extremities, deep tendon reflexes are intact, toes are downgoing  Sensory examination to pinprick light touch proprioception and vibration is preserved bilaterally, patient does not extinguish double simultaneous stimuli  Coordination no evidence of any finger-to-nose dysmetria, no evidence of any dysdiadochokinesia,  Gait is normal based Romberg sign is negative  No meningeal signs, some paraspinal muscle tenderness in the cervical area    ROS:  Review of Systems   Constitutional: Positive for fatigue  HENT: Negative  Eyes: Positive for itching  Respiratory: Negative  Cardiovascular: Positive for leg swelling  Gastrointestinal: Negative  Endocrine: Negative  Genitourinary: Negative  Musculoskeletal: Positive for back pain and neck pain  Skin: Negative  Allergic/Immunologic: Negative  Neurological: Positive for dizziness, light-headedness and headaches  Hematological: Bruises/bleeds easily  Psychiatric/Behavioral: Positive for sleep disturbance

## 2018-07-09 ENCOUNTER — OFFICE VISIT (OUTPATIENT)
Dept: SURGICAL ONCOLOGY | Facility: CLINIC | Age: 53
End: 2018-07-09
Payer: COMMERCIAL

## 2018-07-09 VITALS
TEMPERATURE: 98 F | BODY MASS INDEX: 23.08 KG/M2 | HEART RATE: 58 BPM | SYSTOLIC BLOOD PRESSURE: 120 MMHG | RESPIRATION RATE: 14 BRPM | DIASTOLIC BLOOD PRESSURE: 70 MMHG | WEIGHT: 125.4 LBS | HEIGHT: 62 IN

## 2018-07-09 DIAGNOSIS — N63.0 BREAST LUMP: ICD-10-CM

## 2018-07-09 DIAGNOSIS — N60.02 CYST, BREAST SOLITARY, LEFT: Primary | ICD-10-CM

## 2018-07-09 PROCEDURE — 76942 ECHO GUIDE FOR BIOPSY: CPT | Performed by: SURGERY

## 2018-07-09 PROCEDURE — 99244 OFF/OP CNSLTJ NEW/EST MOD 40: CPT | Performed by: NURSE PRACTITIONER

## 2018-07-09 PROCEDURE — 19000 PUNCTURE ASPIR CYST BREAST: CPT | Performed by: SURGERY

## 2018-07-09 NOTE — PROGRESS NOTES
Surgical Oncology Follow Up       [address] [facility] [place]  [address] [facility] [address] [facility] [address]    Perlie Gaucher  1965  [id number]  [facility] SURGICAL ONCOLOGY [place]  [address] [address]    Chief Complaint   Patient presents with    Breast Mass     Pt is here for initial consultation lump in left breast        Assessment/Plan:  1  Breast lump  - f/u PRN- call with new concerns  - Continue clinical breast exams, mammograms with GYN      Discussion/Summary: Patient is a 47year old female who presents today for a new patient consult for a large left breast cyst  An u/s was performed by Dr Khadar Ramírez and the cyst was aspirated today  On exam, she also has what feels like a right retro areolar cyst but the patient states this does not bother her- she will monitor this area  Correlates to cyst on most recent mammogram  We discussed wearing a supportive bra, decreasing salt and caffeine intake and NSAID use to help with any future breast pain  The patient does state that her pain is improved after the aspiration  The patient is followed by Dr Raji Hernandez and already has a bilateral mammogram ordered for November  She can see us on a PRN basis  She was instructed to call with any new concerns or symptoms  All of her questions were answered  History of Present Illness:      -Interval History: Patient presents today for a new consult for a left breast lump  She first noticed this lump in April and underwent a left breast mammogram and u/s on 4/27/18 which was BIRADS 2  A large, 5 6 x 4 5 cm cyst was noted at the 3:00 position  Patient declied aspiration at that time  She states she GYN provider suggested she observe for 1 mo  The patient presents today because she feels the cyst is enlarging rather than decreasing in size  She would like to have the cyst aspirated today, as the cyst is causing her pain  Menarche age 16  Three pregnancies, 2 live births, age 24 at time of first child  Has never used hormone replacement  Does not smoke or drink  Review of Systems:  Review of Systems   Constitutional: Negative for activity change, appetite change, chills, fatigue, fever and unexpected weight change  HENT: Negative for trouble swallowing  Eyes: Negative for pain, redness and visual disturbance  Respiratory: Negative for cough, shortness of breath and wheezing  Cardiovascular: Negative for chest pain, palpitations and leg swelling  Gastrointestinal: Negative for abdominal pain, constipation, diarrhea, nausea and vomiting  Endocrine: Negative for cold intolerance and heat intolerance  Musculoskeletal: Positive for arthralgias  Negative for back pain, gait problem and myalgias  Skin: Negative for color change and rash  Neurological: Positive for weakness and headaches  Negative for dizziness, syncope, light-headedness and numbness  Hematological: Negative for adenopathy  Bruises/bleeds easily  Psychiatric/Behavioral: Negative for agitation and confusion  All other systems reviewed and are negative        Patient Active Problem List   Diagnosis    Dry eye syndrome    Headache    HPV (human papilloma virus) infection    Elevated cholesterol    Right shoulder pain    Breast lump     Past Medical History:   Diagnosis Date    Anemia     iron defficiency    ASCUS with positive high risk HPV cervical 2015    Asthma     last assessed - [id number]    Colon polyps     Dysfunctional uterine bleeding     last assessed - 28Apr2015    History of colonic polyps     Resolved - 2017    HPV (human papilloma virus) anogenital infection     Hyperlipidemia     diet controlled    Kidney stone     Normal delivery     1984 son 29 weeks, 1985 son 32 weeks    PONV (postoperative nausea and vomiting)      Past Surgical History:   Procedure Laterality Date    BONE BIOPSY Left 6/4/2018    Procedure: FOOT SOFT TISSUE MASS REMOVAL;  Surgeon: Chaneta Leventhal, DPM;  Location: [facility];  Service: Podiatry    BREAST BIOPSY Left     Percutaneous needle core; last assessed - [id number]    BREAST SURGERY      COLONOSCOPY      COLPOSCOPY      COLPOSCOPY W/ BIOPSY / Pam Edison  04/28/2015    Colposcopy Cervix with Biopsy(s) - COLPO=neg; last assessed - 28Apr2015    DILATION AND CURETTAGE OF UTERUS      ENDOMETRIAL BIOPSY  04/28/2015    by suction; EB/COLPO = Negative Results; last assessed - 30Apr2015    FOOT SURGERY      LITHOTRIPSY      Renal    [place] COLONOSCOPY FLX DX W/COLLJ SPEC WHEN PFRMD N/A 12/5/2017    Procedure: COLONOSCOPY;  Surgeon: Harshil Payton MD;  Location: [facility]; Service: Gastroenterology; Complete Colonoscopy    TUBAL LIGATION       Family History   Problem Relation Age of Onset    Arthritis Mother     Hypertension Mother     Diabetes Father      Social History     Social History    Marital status: Single     Spouse name: N/A    Number of children: 2    Years of education: N/A     Occupational History    Not on file       Social History Main Topics    Smoking status: Never Smoker    Smokeless tobacco: Never Used    Alcohol use No    Drug use: No    Sexual activity: No      Comment: abstinence     Other Topics Concern    Not on file     Social History Narrative    Drinks coffee    Exercise habits       Current Outpatient Prescriptions:     amitriptyline (ELAVIL) 10 mg tablet, Take 1 tablet (10 mg total) by mouth daily at bedtime, Disp: 30 tablet, Rfl: 1    cycloSPORINE (RESTASIS) 0 05 % ophthalmic emulsion, 1 drop 2 (two) times a day, Disp: , Rfl:     loteprednol etabonate (LOTEMAX) 0 5 % ophthalmic suspension, 1 drop 4 (four) times a day, Disp: , Rfl:     multivitamin (THERAGRAN) TABS, Take 1 tablet by mouth daily, Disp: , Rfl:     nabumetone (RELAFEN) 500 mg tablet, Take 1 tab PO BID X 5 days then PRN pain , Disp: 30 tablet, Rfl: 0  Allergies   Allergen Reactions    Penicillins Hives Vitals:    07/09/18 0945   BP: 120/70   Pulse: 58   Resp: 14   Temp: 98 °F (36 7 °C)       Physical Exam   Constitutional: She is oriented to person, place, and time  Vital signs are normal  She appears well-developed and well-nourished  No distress  HENT:   Head: Normocephalic and atraumatic  Neck: Normal range of motion  Cardiovascular: Normal rate, regular rhythm and normal heart sounds  Pulmonary/Chest: Effort normal and breath sounds normal    Bilateral breasts were examined in the sitting and supine position  Large left breast mass, occupying most of lower outer quadrant  Right retro areolar round lump, soft, mobile- exam consistent with probable cyst- pt will montior  There are no other dominant masses, skin nodules, nipple changes or nipple discharge  There is no bilateral supraclavicular or axillary lymphadenopathy noted  Abdominal: Soft  Normal appearance  She exhibits no mass  There is no hepatosplenomegaly  There is no tenderness  Musculoskeletal: Normal range of motion  Lymphadenopathy:     She has no axillary adenopathy  Right: No supraclavicular adenopathy present  Left: No supraclavicular adenopathy present  Neurological: She is alert and oriented to person, place, and time  Skin: Skin is warm, dry and intact  No rash noted  She is not diaphoretic  Psychiatric: She has a normal mood and affect  Her speech is normal    Vitals reviewed  Advance Care Planning/Advance Directives:  Discussed disease status, treatment goals with the patient

## 2018-07-09 NOTE — TELEPHONE ENCOUNTER
It appears patient was started on amitriptyline for headaches  Please inform patient that I will start this medication from time to time on patient's as well  I am in agreement with her taking the medication

## 2018-07-09 NOTE — PROGRESS NOTES
US Guided FNA     Date/Time 7/9/2018 10:37 AM     Performed by  Mervin Cowan     Authorized by Mervin Cowan       Site preparation: Isopropyl alcohol    Local anesthesia used: yes     Anesthesia   Local anesthesia used: yes  Local Anesthetic: lidocaine 1% without epinephrine  Anesthetic total: 3 mL     Procedure Details   Procedure Notes: The large cyst in the upper outer left breast was identified using ultrasound  This was greater than 4 cm in size  The skin was cleaned using alcohol  1% lidocaine plain was used for local anesthetic  An 18 gauge needle was used to aspirate the cyst under direct ultrasound guidance  36 cc of yellow tinged fluid was obtained and discarded  There was complete resolution noted  She tolerated the procedure well without complication  A dressing was applied

## 2018-07-18 ENCOUNTER — APPOINTMENT (OUTPATIENT)
Dept: LAB | Facility: MEDICAL CENTER | Age: 53
End: 2018-07-18
Payer: COMMERCIAL

## 2018-07-18 DIAGNOSIS — R51.9 NONINTRACTABLE HEADACHE, UNSPECIFIED CHRONICITY PATTERN, UNSPECIFIED HEADACHE TYPE: ICD-10-CM

## 2018-07-18 LAB
ALBUMIN SERPL BCP-MCNC: 4 G/DL (ref 3.5–5)
ALP SERPL-CCNC: 55 U/L (ref 46–116)
ALT SERPL W P-5'-P-CCNC: 23 U/L (ref 12–78)
ANION GAP SERPL CALCULATED.3IONS-SCNC: 5 MMOL/L (ref 4–13)
AST SERPL W P-5'-P-CCNC: 19 U/L (ref 5–45)
BASOPHILS # BLD AUTO: 0.02 THOUSANDS/ΜL (ref 0–0.1)
BASOPHILS NFR BLD AUTO: 1 % (ref 0–1)
BILIRUB SERPL-MCNC: 1.02 MG/DL (ref 0.2–1)
BUN SERPL-MCNC: 18 MG/DL (ref 5–25)
CALCIUM SERPL-MCNC: 9.7 MG/DL (ref 8.3–10.1)
CHLORIDE SERPL-SCNC: 106 MMOL/L (ref 100–108)
CO2 SERPL-SCNC: 26 MMOL/L (ref 21–32)
CREAT SERPL-MCNC: 0.87 MG/DL (ref 0.6–1.3)
CRP SERPL QL: <3 MG/L
EOSINOPHIL # BLD AUTO: 0.08 THOUSAND/ΜL (ref 0–0.61)
EOSINOPHIL NFR BLD AUTO: 2 % (ref 0–6)
ERYTHROCYTE [DISTWIDTH] IN BLOOD BY AUTOMATED COUNT: 14.5 % (ref 11.6–15.1)
ERYTHROCYTE [SEDIMENTATION RATE] IN BLOOD: 18 MM/HOUR (ref 0–20)
GFR SERPL CREATININE-BSD FRML MDRD: 77 ML/MIN/1.73SQ M
GLUCOSE P FAST SERPL-MCNC: 80 MG/DL (ref 65–99)
HCT VFR BLD AUTO: 44.6 % (ref 34.8–46.1)
HGB BLD-MCNC: 14.5 G/DL (ref 11.5–15.4)
IMM GRANULOCYTES # BLD AUTO: 0 THOUSAND/UL (ref 0–0.2)
IMM GRANULOCYTES NFR BLD AUTO: 0 % (ref 0–2)
LYMPHOCYTES # BLD AUTO: 1.66 THOUSANDS/ΜL (ref 0.6–4.47)
LYMPHOCYTES NFR BLD AUTO: 40 % (ref 14–44)
MCH RBC QN AUTO: 30.9 PG (ref 26.8–34.3)
MCHC RBC AUTO-ENTMCNC: 32.5 G/DL (ref 31.4–37.4)
MCV RBC AUTO: 95 FL (ref 82–98)
MONOCYTES # BLD AUTO: 0.38 THOUSAND/ΜL (ref 0.17–1.22)
MONOCYTES NFR BLD AUTO: 9 % (ref 4–12)
NEUTROPHILS # BLD AUTO: 2.05 THOUSANDS/ΜL (ref 1.85–7.62)
NEUTS SEG NFR BLD AUTO: 48 % (ref 43–75)
NRBC BLD AUTO-RTO: 0 /100 WBCS
PLATELET # BLD AUTO: 283 THOUSANDS/UL (ref 149–390)
PMV BLD AUTO: 11.8 FL (ref 8.9–12.7)
POTASSIUM SERPL-SCNC: 4.5 MMOL/L (ref 3.5–5.3)
PROT SERPL-MCNC: 8.3 G/DL (ref 6.4–8.2)
RBC # BLD AUTO: 4.69 MILLION/UL (ref 3.81–5.12)
SODIUM SERPL-SCNC: 137 MMOL/L (ref 136–145)
WBC # BLD AUTO: 4.19 THOUSAND/UL (ref 4.31–10.16)

## 2018-07-18 PROCEDURE — 85025 COMPLETE CBC W/AUTO DIFF WBC: CPT

## 2018-07-18 PROCEDURE — 85652 RBC SED RATE AUTOMATED: CPT

## 2018-07-18 PROCEDURE — 86140 C-REACTIVE PROTEIN: CPT

## 2018-07-18 PROCEDURE — 80053 COMPREHEN METABOLIC PANEL: CPT

## 2018-07-18 PROCEDURE — 36415 COLL VENOUS BLD VENIPUNCTURE: CPT

## 2018-07-18 PROCEDURE — 86618 LYME DISEASE ANTIBODY: CPT

## 2018-07-20 ENCOUNTER — HOSPITAL ENCOUNTER (OUTPATIENT)
Dept: MRI IMAGING | Facility: HOSPITAL | Age: 53
Discharge: HOME | End: 2018-07-20
Attending: PSYCHIATRY & NEUROLOGY
Payer: COMMERCIAL

## 2018-07-20 DIAGNOSIS — R51.9 NONINTRACTABLE HEADACHE, UNSPECIFIED CHRONICITY PATTERN, UNSPECIFIED HEADACHE TYPE: ICD-10-CM

## 2018-07-20 LAB
B BURGDOR IGG SER IA-ACNC: 0.15
B BURGDOR IGM SER IA-ACNC: 0.21

## 2018-07-20 PROCEDURE — 70551 MRI BRAIN STEM W/O DYE: CPT

## 2018-11-21 DIAGNOSIS — Z12.31 ENCOUNTER FOR SCREENING MAMMOGRAM FOR MALIGNANT NEOPLASM OF BREAST: ICD-10-CM

## 2018-12-27 ENCOUNTER — OFFICE VISIT (OUTPATIENT)
Dept: FAMILY MEDICINE CLINIC | Facility: MEDICAL CENTER | Age: 53
End: 2018-12-27
Payer: COMMERCIAL

## 2018-12-27 VITALS
TEMPERATURE: 98.6 F | BODY MASS INDEX: 23.04 KG/M2 | DIASTOLIC BLOOD PRESSURE: 88 MMHG | WEIGHT: 127 LBS | SYSTOLIC BLOOD PRESSURE: 136 MMHG | HEART RATE: 68 BPM | RESPIRATION RATE: 16 BRPM

## 2018-12-27 DIAGNOSIS — J01.10 ACUTE NON-RECURRENT FRONTAL SINUSITIS: Primary | ICD-10-CM

## 2018-12-27 DIAGNOSIS — J01.00 ACUTE NON-RECURRENT MAXILLARY SINUSITIS: ICD-10-CM

## 2018-12-27 PROCEDURE — 99214 OFFICE O/P EST MOD 30 MIN: CPT | Performed by: FAMILY MEDICINE

## 2018-12-27 PROCEDURE — 1036F TOBACCO NON-USER: CPT | Performed by: FAMILY MEDICINE

## 2018-12-27 RX ORDER — AZITHROMYCIN 250 MG/1
TABLET, FILM COATED ORAL
Qty: 6 TABLET | Refills: 0 | Status: SHIPPED | OUTPATIENT
Start: 2018-12-27 | End: 2018-12-31

## 2018-12-27 NOTE — PROGRESS NOTES
Assessment/Plan:    No problem-specific Assessment & Plan notes found for this encounter  Diagnoses and all orders for this visit:    Acute non-recurrent frontal sinusitis  -     azithromycin (ZITHROMAX) 250 mg tablet; Take 2 tablets today then 1 tablet daily x 4 days    Acute non-recurrent maxillary sinusitis  -     azithromycin (ZITHROMAX) 250 mg tablet; Take 2 tablets today then 1 tablet daily x 4 days    Patient has new onset acute maxillary and frontal sinusitis  Likely bacterial and likely secondary to an upper respiratory infection based on symptoms and exam findings  I am going to have patient start a course of azithromycin due to her penicillin allergy  Follow-up in one week if symptoms persist or sooner if needed  Subjective:      Patient ID: Zakiya Rodriguez is a 47 y o  female  Patient presents with a chief complaint of sinus pain  Location is the forehead and cheeks  Described as a pressure sensation  Symptoms started one week ago  Has associated ear pain and cough  Symptoms have been getting progressively worse  Symptoms are improved when patient goes from a warm in door environment into the outside cold  Symptoms do not seem to be exacerbated by anything however  Patient is a nonsmoker          The following portions of the patient's history were reviewed and updated as appropriate:   She   Patient Active Problem List    Diagnosis Date Noted    Cyst, breast solitary, left 07/09/2018    Breast lump 07/03/2018    Dry eye syndrome 10/25/2017    Headache 10/25/2017    Elevated cholesterol 10/25/2017    Right shoulder pain 10/25/2017    HPV (human papilloma virus) infection 04/28/2015     Current Outpatient Prescriptions   Medication Sig Dispense Refill    amitriptyline (ELAVIL) 10 mg tablet Take 1 tablet (10 mg total) by mouth daily at bedtime 30 tablet 1    cycloSPORINE (RESTASIS) 0 05 % ophthalmic emulsion 1 drop 2 (two) times a day      loteprednol etabonate (LOTEMAX) 0 5 % ophthalmic suspension 1 drop 4 (four) times a day      multivitamin (THERAGRAN) TABS Take 1 tablet by mouth daily      azithromycin (ZITHROMAX) 250 mg tablet Take 2 tablets today then 1 tablet daily x 4 days 6 tablet 0    nabumetone (RELAFEN) 500 mg tablet Take 1 tab PO BID X 5 days then PRN pain  30 tablet 0     No current facility-administered medications for this visit  She is allergic to penicillins       Review of Systems   Constitutional: Negative for fever  Respiratory: Negative for shortness of breath  Cardiovascular: Negative for chest pain  Objective:      /88 (BP Location: Left arm, Patient Position: Sitting, Cuff Size: Adult)   Pulse 68   Temp 98 6 °F (37 °C) (Oral)   Resp 16   Wt 57 6 kg (127 lb)   BMI 23 04 kg/m²          Physical Exam   Constitutional: Vital signs are normal  She appears well-developed and well-nourished  HENT:   Head: Normocephalic and atraumatic  Right Ear: Tympanic membrane, external ear and ear canal normal    Left Ear: Tympanic membrane, external ear and ear canal normal    Nose: Mucosal edema and rhinorrhea present  Right sinus exhibits maxillary sinus tenderness and frontal sinus tenderness  Left sinus exhibits maxillary sinus tenderness and frontal sinus tenderness  Mouth/Throat: Uvula is midline and mucous membranes are normal    Post Nasal Drainage  Eyes: Pupils are equal, round, and reactive to light  Conjunctivae, EOM and lids are normal    Neck: Trachea normal    Cardiovascular: Normal rate, regular rhythm and normal heart sounds  No murmur heard  Pulmonary/Chest: Effort normal and breath sounds normal    Lymphadenopathy:     She has cervical adenopathy  Right cervical: Superficial cervical adenopathy present  Left cervical: Superficial cervical adenopathy present

## 2019-01-02 ENCOUNTER — ANNUAL EXAM (OUTPATIENT)
Dept: OBGYN CLINIC | Facility: MEDICAL CENTER | Age: 54
End: 2019-01-02
Payer: COMMERCIAL

## 2019-01-02 ENCOUNTER — APPOINTMENT (OUTPATIENT)
Dept: LAB | Facility: MEDICAL CENTER | Age: 54
End: 2019-01-02
Payer: COMMERCIAL

## 2019-01-02 ENCOUNTER — OFFICE VISIT (OUTPATIENT)
Dept: FAMILY MEDICINE CLINIC | Facility: MEDICAL CENTER | Age: 54
End: 2019-01-02
Payer: COMMERCIAL

## 2019-01-02 VITALS
BODY MASS INDEX: 23.64 KG/M2 | SYSTOLIC BLOOD PRESSURE: 110 MMHG | DIASTOLIC BLOOD PRESSURE: 70 MMHG | HEIGHT: 61 IN | WEIGHT: 125.2 LBS

## 2019-01-02 VITALS
DIASTOLIC BLOOD PRESSURE: 72 MMHG | HEART RATE: 64 BPM | WEIGHT: 126.38 LBS | RESPIRATION RATE: 14 BRPM | HEIGHT: 62 IN | BODY MASS INDEX: 23.25 KG/M2 | SYSTOLIC BLOOD PRESSURE: 112 MMHG

## 2019-01-02 DIAGNOSIS — Z01.419 ENCOUNTER FOR GYNECOLOGICAL EXAMINATION (GENERAL) (ROUTINE) WITHOUT ABNORMAL FINDINGS: Primary | ICD-10-CM

## 2019-01-02 DIAGNOSIS — Z11.51 ENCOUNTER FOR SCREENING FOR HUMAN PAPILLOMAVIRUS (HPV): ICD-10-CM

## 2019-01-02 DIAGNOSIS — Z13.1 SCREENING FOR DIABETES MELLITUS: ICD-10-CM

## 2019-01-02 DIAGNOSIS — Z13.0 SCREENING, ANEMIA, DEFICIENCY, IRON: ICD-10-CM

## 2019-01-02 DIAGNOSIS — Z12.4 CERVICAL CANCER SCREENING: ICD-10-CM

## 2019-01-02 DIAGNOSIS — Z12.39 ENCOUNTER FOR SCREENING FOR MALIGNANT NEOPLASM OF BREAST: ICD-10-CM

## 2019-01-02 DIAGNOSIS — D21.9 FIBROID: ICD-10-CM

## 2019-01-02 DIAGNOSIS — E78.00 ELEVATED CHOLESTEROL: ICD-10-CM

## 2019-01-02 DIAGNOSIS — Z00.00 PHYSICAL EXAM, ANNUAL: Primary | ICD-10-CM

## 2019-01-02 DIAGNOSIS — N60.02 CYST, BREAST SOLITARY, LEFT: ICD-10-CM

## 2019-01-02 LAB
ALBUMIN SERPL BCP-MCNC: 3.9 G/DL (ref 3.5–5)
ALP SERPL-CCNC: 57 U/L (ref 46–116)
ALT SERPL W P-5'-P-CCNC: 22 U/L (ref 12–78)
ANION GAP SERPL CALCULATED.3IONS-SCNC: 9 MMOL/L (ref 4–13)
AST SERPL W P-5'-P-CCNC: 17 U/L (ref 5–45)
BASOPHILS # BLD AUTO: 0.02 THOUSANDS/ΜL (ref 0–0.1)
BASOPHILS NFR BLD AUTO: 0 % (ref 0–1)
BILIRUB SERPL-MCNC: 0.52 MG/DL (ref 0.2–1)
BUN SERPL-MCNC: 17 MG/DL (ref 5–25)
CALCIUM SERPL-MCNC: 10.1 MG/DL (ref 8.3–10.1)
CHLORIDE SERPL-SCNC: 105 MMOL/L (ref 100–108)
CHOLEST SERPL-MCNC: 237 MG/DL (ref 50–200)
CO2 SERPL-SCNC: 24 MMOL/L (ref 21–32)
CREAT SERPL-MCNC: 0.83 MG/DL (ref 0.6–1.3)
EOSINOPHIL # BLD AUTO: 0.06 THOUSAND/ΜL (ref 0–0.61)
EOSINOPHIL NFR BLD AUTO: 1 % (ref 0–6)
ERYTHROCYTE [DISTWIDTH] IN BLOOD BY AUTOMATED COUNT: 14 % (ref 11.6–15.1)
EST. AVERAGE GLUCOSE BLD GHB EST-MCNC: 123 MG/DL
FERRITIN SERPL-MCNC: 12 NG/ML (ref 8–388)
GFR SERPL CREATININE-BSD FRML MDRD: 81 ML/MIN/1.73SQ M
GLUCOSE P FAST SERPL-MCNC: 80 MG/DL (ref 65–99)
HBA1C MFR BLD: 5.9 % (ref 4.2–6.3)
HCT VFR BLD AUTO: 45.4 % (ref 34.8–46.1)
HDLC SERPL-MCNC: 55 MG/DL (ref 40–60)
HGB BLD-MCNC: 14.5 G/DL (ref 11.5–15.4)
IMM GRANULOCYTES # BLD AUTO: 0.02 THOUSAND/UL (ref 0–0.2)
IMM GRANULOCYTES NFR BLD AUTO: 0 % (ref 0–2)
IRON SATN MFR SERPL: 14 %
IRON SERPL-MCNC: 59 UG/DL (ref 50–170)
LDLC SERPL CALC-MCNC: 150 MG/DL (ref 0–100)
LYMPHOCYTES # BLD AUTO: 1.73 THOUSANDS/ΜL (ref 0.6–4.47)
LYMPHOCYTES NFR BLD AUTO: 38 % (ref 14–44)
MCH RBC QN AUTO: 29.7 PG (ref 26.8–34.3)
MCHC RBC AUTO-ENTMCNC: 31.9 G/DL (ref 31.4–37.4)
MCV RBC AUTO: 93 FL (ref 82–98)
MONOCYTES # BLD AUTO: 0.3 THOUSAND/ΜL (ref 0.17–1.22)
MONOCYTES NFR BLD AUTO: 7 % (ref 4–12)
NEUTROPHILS # BLD AUTO: 2.38 THOUSANDS/ΜL (ref 1.85–7.62)
NEUTS SEG NFR BLD AUTO: 54 % (ref 43–75)
NRBC BLD AUTO-RTO: 0 /100 WBCS
PLATELET # BLD AUTO: 329 THOUSANDS/UL (ref 149–390)
PMV BLD AUTO: 11.6 FL (ref 8.9–12.7)
POTASSIUM SERPL-SCNC: 4.2 MMOL/L (ref 3.5–5.3)
PROT SERPL-MCNC: 8.6 G/DL (ref 6.4–8.2)
RBC # BLD AUTO: 4.88 MILLION/UL (ref 3.81–5.12)
SODIUM SERPL-SCNC: 138 MMOL/L (ref 136–145)
TIBC SERPL-MCNC: 423 UG/DL (ref 250–450)
TRIGL SERPL-MCNC: 159 MG/DL
WBC # BLD AUTO: 4.51 THOUSAND/UL (ref 4.31–10.16)

## 2019-01-02 PROCEDURE — 99396 PREV VISIT EST AGE 40-64: CPT | Performed by: FAMILY MEDICINE

## 2019-01-02 PROCEDURE — 80061 LIPID PANEL: CPT

## 2019-01-02 PROCEDURE — S0612 ANNUAL GYNECOLOGICAL EXAMINA: HCPCS | Performed by: NURSE PRACTITIONER

## 2019-01-02 PROCEDURE — 83036 HEMOGLOBIN GLYCOSYLATED A1C: CPT

## 2019-01-02 PROCEDURE — 87624 HPV HI-RISK TYP POOLED RSLT: CPT | Performed by: NURSE PRACTITIONER

## 2019-01-02 PROCEDURE — 36415 COLL VENOUS BLD VENIPUNCTURE: CPT

## 2019-01-02 PROCEDURE — 83540 ASSAY OF IRON: CPT

## 2019-01-02 PROCEDURE — 82728 ASSAY OF FERRITIN: CPT

## 2019-01-02 PROCEDURE — 80053 COMPREHEN METABOLIC PANEL: CPT

## 2019-01-02 PROCEDURE — 85025 COMPLETE CBC W/AUTO DIFF WBC: CPT

## 2019-01-02 PROCEDURE — 83550 IRON BINDING TEST: CPT

## 2019-01-02 PROCEDURE — G0145 SCR C/V CYTO,THINLAYER,RESCR: HCPCS | Performed by: NURSE PRACTITIONER

## 2019-01-02 NOTE — PROGRESS NOTES
Assessment/Plan:    No problem-specific Assessment & Plan notes found for this encounter  Diagnoses and all orders for this visit:    Physical exam, annual    Elevated cholesterol  -     Comprehensive metabolic panel; Future  -     Lipid Panel with Direct LDL reflex; Future    Screening for diabetes mellitus  -     Hemoglobin A1C; Future    Screening, anemia, deficiency, iron  -     CBC and differential; Future  -     Iron Panel; Future    Patient appears to be doing well overall  Aside from patient eating junk food she does have a relatively healthy lifestyle  Continuation of this was encouraged  Patient has known elevated cholesterol and therefore we will get some labs to monitor this  Will screen for diabetes as well and check labs for anemia screening as well as iron deficiency as patient had this in the past     Continue regular follow-up appointments with Gynecology  Flu vaccine highly recommended but patient declined  Follow-up in one year or sooner if needed  Subjective:      Patient ID: Keny Brooks is a 47 y o  female  Patient presents for a physical exam   She is a nonsmoker  No alcohol use  No drug use  She tries to eat healthy diet but does enjoy her junk food  She exercises regularly  Does have a gynecologist   Did have a colonoscopy  Does not want the flu shot  No acute concerns  The following portions of the patient's history were reviewed and updated as appropriate:   She  has a past medical history of Anemia; ASCUS with positive high risk HPV cervical (2015); Asthma; Headache; History of colonic polyps; HPV (human papilloma virus) infection; Hyperlipidemia; Kidney stone; and Uterine fibroid    She   Patient Active Problem List    Diagnosis Date Noted    Fibroid 01/02/2019    Encounter for gynecological examination (general) (routine) without abnormal findings 01/02/2019    Cyst, breast solitary, left 07/09/2018    Breast lump 07/03/2018    Dry eye syndrome 10/25/2017    Headache 10/25/2017    Elevated cholesterol 10/25/2017    Right shoulder pain 10/25/2017    HPV (human papilloma virus) infection 04/28/2015     She  has a past surgical history that includes Colposcopy; Endometrial biopsy (04/28/2015); Tubal ligation; Lithotripsy; pr colonoscopy flx dx w/collj spec when pfrmd (N/A, 12/5/2017); Colposcopy w/ biopsy / curettage (04/28/2015); Breast surgery; Breast biopsy (Left); Colonoscopy; Dilation and curettage of uterus; Bone biopsy (Left, 6/4/2018); and Foot surgery  Her family history includes Arthritis in her mother; Diabetes in her father; Hypertension in her brother, maternal grandfather, maternal grandmother, and mother; No Known Problems in her brother, brother, brother, paternal grandfather, paternal grandmother, son, and son  She  reports that she has never smoked  She has never used smokeless tobacco  She reports that she does not drink alcohol or use drugs  Current Outpatient Prescriptions   Medication Sig Dispense Refill    cycloSPORINE (RESTASIS) 0 05 % ophthalmic emulsion 1 drop 2 (two) times a day      loteprednol etabonate (LOTEMAX) 0 5 % ophthalmic suspension 1 drop 4 (four) times a day      multivitamin (THERAGRAN) TABS Take 1 tablet by mouth daily       No current facility-administered medications for this visit  She is allergic to penicillins  PHQ-9 Depression Screening    PHQ-9:    Frequency of the following problems over the past two weeks:       Little interest or pleasure in doing things:  0 - not at all  Feeling down, depressed, or hopeless:  0 - not at all  PHQ-2 Score:  0         Review of Systems   Constitutional: Negative for fever  Respiratory: Negative for shortness of breath  Cardiovascular: Negative for chest pain  Gastrointestinal: Negative for abdominal pain and blood in stool  Genitourinary: Negative for dysuria and hematuria  Musculoskeletal: Negative for arthralgias and myalgias     Skin: Negative for rash  Neurological: Negative for headaches  Objective:      /72   Pulse 64   Resp 14   Ht 5' 2 25" (1 581 m)   Wt 57 3 kg (126 lb 6 oz)   LMP 12/17/2018   BMI 22 93 kg/m²          Physical Exam   Constitutional: She is oriented to person, place, and time  Vital signs are normal  She appears well-developed and well-nourished  HENT:   Head: Normocephalic and atraumatic  Right Ear: Tympanic membrane, external ear and ear canal normal    Left Ear: Tympanic membrane, external ear and ear canal normal    Nose: Nose normal    Mouth/Throat: Uvula is midline, oropharynx is clear and moist and mucous membranes are normal    Eyes: Pupils are equal, round, and reactive to light  Conjunctivae, EOM and lids are normal    Neck: Trachea normal  Neck supple  No thyromegaly present  Cardiovascular: Normal rate, regular rhythm, S1 normal and S2 normal     No murmur heard  Pulmonary/Chest: Effort normal and breath sounds normal    Abdominal: Soft  Bowel sounds are normal  There is no tenderness  Lymphadenopathy:     She has no cervical adenopathy  Neurological: She is alert and oriented to person, place, and time  No cranial nerve deficit  Skin: Skin is warm, dry and intact  Psychiatric: She has a normal mood and affect   Her speech is normal and behavior is normal  Thought content normal

## 2019-01-02 NOTE — PROGRESS NOTES
Assessment/Plan:    Cyst, breast solitary, left  Resolved since aspiration was performed by Dr Alton Torrez  Orders provided for routine screening mammo  Advised continued monthly SBE and reviewed reasons to call  Fibroid  Pt reports h/o uterine fibroid  2015 pelvic US with one fibroid present, measuring 1 3cm at greatest diameter  The patient reports occasional heavy menses without features of DUB, and expresses interest in active management, as this sometimes affects qol  Pelvic US recommended after next menses and will f/u with results  Encounter for gynecological examination (general) (routine) without abnormal findings  Benign findings on routine gyn exam  Recommended monthly SBE, annual CBE and annual screening mammo  ASCCP guidelines reviewed and pap with cotesting was collected; this low risk patient was advised she meets criteria to d/c pap screening at age 73  Colonoscopy UTD  The patient denies STI risk factors and declines testing at this time  Reviewed diet/activity recommendations  Discussed perimenopausal considerations and symptoms to report  RTO in one year for routine annual gyn exam or sooner PRN  Diagnoses and all orders for this visit:    Encounter for gynecological examination (general) (routine) without abnormal findings    Encounter for screening for malignant neoplasm of breast  -     Mammo screening bilateral w cad; Future    Fibroid  -     US pelvis complete w transvaginal; Future    Cervical cancer screening  -     Liquid-based pap, screening    Encounter for screening for human papillomavirus (HPV)  -     Liquid-based pap, screening    Cyst, breast solitary, left          Subjective:      Patient ID: Mau Rockwell is a 47 y o  female  This patient presents for routine annual gyn exam    Menses are regular; duration 5-7d; occasionally with heavier bleeding and clots  Pt reports h/o uterine fibroids and wonders if this is the cause   Interested in discussing options for management  She denies acute gyn complaints otherwise  She denies pelvic pain, breast concerns, abnormal discharge, bowel/bladder dysfunction, depression/anxiety  Not currently sexually active  She denies STI concerns  The following portions of the patient's history were reviewed and updated as appropriate: allergies, current medications, past family history, past medical history, past social history, past surgical history and problem list     Review of Systems   Constitutional: Negative  HENT: Negative  Eyes: Negative  Respiratory: Negative  Cardiovascular: Negative  Gastrointestinal: Negative  Endocrine: Negative  Genitourinary: Negative  Musculoskeletal: Negative  Skin: Negative  Allergic/Immunologic: Negative  Neurological: Negative  Hematological: Negative  Psychiatric/Behavioral: Negative  Objective:      /70 (BP Location: Left arm, Cuff Size: Standard)   Ht 5' 1" (1 549 m)   Wt 56 8 kg (125 lb 3 2 oz)   LMP 12/17/2018   BMI 23 66 kg/m²          Physical Exam   Constitutional: She is oriented to person, place, and time  She appears well-developed and well-nourished  HENT:   Head: Normocephalic and atraumatic  Eyes: Pupils are equal, round, and reactive to light  EOM are normal    Neck: Normal range of motion  Neck supple  No thyromegaly present  Cardiovascular: Normal rate, regular rhythm and normal heart sounds  Pulmonary/Chest: Effort normal and breath sounds normal  No respiratory distress  She has no wheezes  She has no rales  She exhibits no mass, no tenderness and no deformity  Right breast exhibits no inverted nipple, no mass, no nipple discharge, no skin change and no tenderness  Left breast exhibits no inverted nipple, no mass, no nipple discharge, no skin change and no tenderness  Breasts are symmetrical    Abdominal: Soft  She exhibits no distension and no mass  There is no splenomegaly or hepatomegaly   There is no tenderness  There is no rebound and no guarding  Genitourinary: Rectum normal, vagina normal and uterus normal  No breast swelling, tenderness or discharge  No labial fusion  There is no rash, tenderness, lesion or injury on the right labia  There is no rash, tenderness, lesion or injury on the left labia  Cervix exhibits no motion tenderness, no discharge and no friability  Right adnexum displays no mass, no tenderness and no fullness  Left adnexum displays no mass, no tenderness and no fullness  No erythema, tenderness or bleeding in the vagina  No foreign body in the vagina  No vaginal discharge found  Musculoskeletal: Normal range of motion  Lymphadenopathy:     She has no cervical adenopathy  She has no axillary adenopathy  Neurological: She is alert and oriented to person, place, and time  No cranial nerve deficit  Skin: Skin is warm and dry  No rash noted  No cyanosis  Nails show no clubbing  Psychiatric: She has a normal mood and affect   Her speech is normal and behavior is normal  Judgment and thought content normal  Cognition and memory are normal

## 2019-01-02 NOTE — ASSESSMENT & PLAN NOTE
Resolved since aspiration was performed by Dr Igor Ortiz  Orders provided for routine screening mammo  Advised continued monthly SBE and reviewed reasons to call

## 2019-01-02 NOTE — ASSESSMENT & PLAN NOTE
Benign findings on routine gyn exam  Recommended monthly SBE, annual CBE and annual screening mammo  ASCCP guidelines reviewed and pap with cotesting was collected; this low risk patient was advised she meets criteria to d/c pap screening at age 73  Colonoscopy UTD  The patient denies STI risk factors and declines testing at this time  Reviewed diet/activity recommendations  Discussed perimenopausal considerations and symptoms to report  RTO in one year for routine annual gyn exam or sooner PRN

## 2019-01-02 NOTE — ASSESSMENT & PLAN NOTE
Pt reports h/o uterine fibroid  2015 pelvic US with one fibroid present, measuring 1 3cm at greatest diameter  The patient reports occasional heavy menses without features of DUB, and expresses interest in active management, as this sometimes affects qol  Pelvic US recommended after next menses and will f/u with results

## 2019-01-03 PROBLEM — R73.09 ELEVATED HEMOGLOBIN A1C: Status: ACTIVE | Noted: 2019-01-03

## 2019-01-03 LAB
HPV HR 12 DNA CVX QL NAA+PROBE: NEGATIVE
HPV16 DNA CVX QL NAA+PROBE: NEGATIVE
HPV18 DNA CVX QL NAA+PROBE: NEGATIVE

## 2019-01-07 ENCOUNTER — TELEPHONE (OUTPATIENT)
Dept: OBGYN CLINIC | Facility: CLINIC | Age: 54
End: 2019-01-07

## 2019-01-07 LAB
LAB AP GYN PRIMARY INTERPRETATION: NORMAL
LAB AP LMP: NORMAL
Lab: NORMAL

## 2019-01-07 NOTE — TELEPHONE ENCOUNTER
----- Message from Shalini Sow, [address] sent at 1/7/2019  3:17 PM EST -----  Normal pap and neg HPV   Please notify patient

## 2019-01-11 ENCOUNTER — HOSPITAL ENCOUNTER (OUTPATIENT)
Dept: RADIOLOGY | Facility: MEDICAL CENTER | Age: 54
Discharge: HOME | End: 2019-01-11
Payer: COMMERCIAL

## 2019-01-11 DIAGNOSIS — D21.9 FIBROID: ICD-10-CM

## 2019-01-11 PROCEDURE — 76856 US EXAM PELVIC COMPLETE: CPT

## 2019-01-11 PROCEDURE — 76830 TRANSVAGINAL US NON-OB: CPT

## 2019-01-18 ENCOUNTER — TELEPHONE (OUTPATIENT)
Dept: OBGYN CLINIC | Facility: CLINIC | Age: 54
End: 2019-01-18

## 2019-01-18 ENCOUNTER — TELEPHONE (OUTPATIENT)
Dept: OBGYN CLINIC | Facility: MEDICAL CENTER | Age: 54
End: 2019-01-18

## 2019-01-18 NOTE — TELEPHONE ENCOUNTER
Routed to [facility] for review  PRABHU BA for patient we will call her when provider reviews

## 2019-01-18 NOTE — TELEPHONE ENCOUNTER
----- Message from Deyvi Beltran, [address] sent at 1/18/2019  2:42 PM EST -----  Pelvic US with benign findings   This was ordered for h/o heavy bleeding with reg menses   2 fibroids are present on this scan and these prevent clear measurement of endo stripe  The fibroids have benign appearance   Ovaries look normal  Given age I would ask that she returns to the office for EMB and further discussion of bleeding management options

## 2019-01-18 NOTE — TELEPHONE ENCOUNTER
----- Message from Earnest Martinez, [address]  sent at 1/18/2019  2:42 PM EST -----  Pelvic US with benign findings   This was ordered for h/o heavy bleeding with reg menses   2 fibroids are present on this scan and these prevent clear measurement of endo stripe  The fibroids have benign appearance   Ovaries look normal  Given age I would ask that she returns to the office for EMB and further discussion of bleeding management options

## 2019-01-23 ENCOUNTER — HOSPITAL ENCOUNTER (OUTPATIENT)
Dept: RADIOLOGY | Facility: MEDICAL CENTER | Age: 54
Discharge: HOME | End: 2019-01-23
Payer: COMMERCIAL

## 2019-01-23 VITALS — WEIGHT: 123 LBS | HEIGHT: 63 IN | BODY MASS INDEX: 21.79 KG/M2

## 2019-01-23 DIAGNOSIS — Z12.39 ENCOUNTER FOR SCREENING FOR MALIGNANT NEOPLASM OF BREAST: ICD-10-CM

## 2019-01-23 PROCEDURE — 77067 SCR MAMMO BI INCL CAD: CPT

## 2019-01-23 PROCEDURE — 77063 BREAST TOMOSYNTHESIS BI: CPT

## 2019-02-06 ENCOUNTER — OFFICE VISIT (OUTPATIENT)
Dept: OBGYN CLINIC | Facility: MEDICAL CENTER | Age: 54
End: 2019-02-06
Payer: COMMERCIAL

## 2019-02-06 VITALS
DIASTOLIC BLOOD PRESSURE: 70 MMHG | WEIGHT: 127 LBS | HEIGHT: 61 IN | BODY MASS INDEX: 23.98 KG/M2 | SYSTOLIC BLOOD PRESSURE: 118 MMHG

## 2019-02-06 DIAGNOSIS — N92.0 MENORRHAGIA WITH REGULAR CYCLE: Primary | ICD-10-CM

## 2019-02-06 DIAGNOSIS — Z32.02 URINE PREGNANCY TEST NEGATIVE: ICD-10-CM

## 2019-02-06 LAB — SL AMB POCT URINE HCG: NEGATIVE

## 2019-02-06 PROCEDURE — 58100 BIOPSY OF UTERUS LINING: CPT | Performed by: NURSE PRACTITIONER

## 2019-02-06 PROCEDURE — 81025 URINE PREGNANCY TEST: CPT | Performed by: NURSE PRACTITIONER

## 2019-02-06 PROCEDURE — 88305 TISSUE EXAM BY PATHOLOGIST: CPT | Performed by: PATHOLOGY

## 2019-02-06 PROCEDURE — 99213 OFFICE O/P EST LOW 20 MIN: CPT | Performed by: NURSE PRACTITIONER

## 2019-02-06 NOTE — ASSESSMENT & PLAN NOTE
C/o heavy menses with most cycles and h/o anemia  Recent pelvic US with 3 small uterine fibroids and endo stripe difficult to visualize  Recent CBC is WNL and the patient is tolerating PO Fe supplementation  Reviewed US results  Advised fibroids with benign features  Recommended EMB and this was collected without difficulty  Reviewed postprocedure considerations and reasons to call  Active management desired  Reviewed medical and surgical options at length - risks/benefits reviewed  The patient expresses interest in Novasure ablation  She will schedule preop to discuss further with a physician colleague  Will advise as indicated with EMB results   The patient agrees with plan

## 2019-02-06 NOTE — PROGRESS NOTES
Assessment/Plan:    Menorrhagia with regular cycle  C/o heavy menses with most cycles and h/o anemia  Recent pelvic US with 3 small uterine fibroids and endo stripe difficult to visualize  Recent CBC is WNL and the patient is tolerating PO Fe supplementation  Reviewed US results  Advised fibroids with benign features  Recommended EMB and this was collected without difficulty  Reviewed postprocedure considerations and reasons to call  Active management desired  Reviewed medical and surgical options at length - risks/benefits reviewed  The patient expresses interest in Novasure ablation  She will schedule preop to discuss further with a physician colleague  Will advise as indicated with EMB results  The patient agrees with plan        Diagnoses and all orders for this visit:    Menorrhagia with regular cycle  -     Tissue Exam  -     Endometrial biopsy          Subjective:      Patient ID: Mellissa Martell is a 47 y o  female  This patient presents to review US results, discuss management options for menorrhagia and have EMB   Most cycles are heavy but all are reg, q month  She has h/o anemia and active management of heavy menses has been advised by PCP   Recent CBC with normal h/h and the patient is tolerating daily Fe supplement   Pelvic US was done recently and 3 small fibroids yielded poor visualization of endo stripe  She denies gyn complaints otherwise   LMP was last week, now resolved         The following portions of the patient's history were reviewed and updated as appropriate: allergies, current medications, past family history, past medical history, past social history, past surgical history and problem list     Review of Systems   Constitutional: Negative  HENT: Negative  Eyes: Negative  Respiratory: Negative  Cardiovascular: Negative  Gastrointestinal: Negative  Endocrine: Negative  Genitourinary: Positive for menstrual problem   Negative for dysuria, pelvic pain, vaginal bleeding and vaginal discharge  Musculoskeletal: Negative  Skin: Negative  Allergic/Immunologic: Negative  Neurological: Negative  Hematological: Negative  Psychiatric/Behavioral: Negative  Objective:      /70 (BP Location: Left arm, Cuff Size: Standard)   Ht 5' 1" (1 549 m)   Wt 57 6 kg (127 lb)   LMP 01/31/2019   BMI 24 00 kg/m²          Physical Exam   Constitutional: She is oriented to person, place, and time  She appears well-developed and well-nourished  HENT:   Head: Normocephalic and atraumatic  Eyes: Pupils are equal, round, and reactive to light  Neck: Normal range of motion  Pulmonary/Chest: Effort normal    Abdominal: Hernia confirmed negative in the right inguinal area and confirmed negative in the left inguinal area  Genitourinary: Rectum normal and uterus normal  There is no rash, tenderness, lesion or injury on the right labia  There is no rash, tenderness, lesion or injury on the left labia  Cervix exhibits no motion tenderness, no discharge and no friability  Right adnexum displays no mass, no tenderness and no fullness  Left adnexum displays no mass, no tenderness and no fullness  No erythema, tenderness or bleeding in the vagina  No vaginal discharge found  Musculoskeletal: Normal range of motion  Lymphadenopathy:        Right: No inguinal adenopathy present  Left: No inguinal adenopathy present  Neurological: She is alert and oriented to person, place, and time  Skin: Skin is warm and dry  Psychiatric: She has a normal mood and affect   Her behavior is normal  Judgment and thought content normal

## 2019-02-06 NOTE — PROGRESS NOTES
Endometrial biopsy  Date/Time: 2/6/2019 10:09 AM  Performed by: Alex Dela Cruz  Authorized by: Alex Dela Cruz     Consent:     Consent obtained:  Verbal and written    Consent given by:  Patient    Procedural risks discussed:  Bleeding, damage to other organs, failure rate, infection, repeat procedure, possible loss of function, possible conversion to open surgery and possible continued pain    Patient questions answered: yes      Patient agrees, verbalizes understanding, and wants to proceed: yes      Educational handouts given: yes      Instructions and paperwork completed: yes    Indication:     Indications comment:  Heavy menses   Pre-procedure:     Pre-procedure timeout performed: yes    Procedure:     Procedure: endometrial biopsy with Pipelle      A bivalve speculum was placed in the vagina: yes      Cervix cleaned and prepped: yes      The cervix was dilated: no      Uterus sounded: yes      Uterus sound depth (cm):  7  Findings:     Uterus size:  Non-gravid    Cervix: normal      Adnexa: normal    Comments: The patient presents for EMB collection  Reviewed risks/benefits of this procedure and consent was obtained  Allergies confirmed and time out performed  The patient was positioned in lithotomy and spec was inserted  Cervix was visualized and cleansed with betadine x3  Pipelle was passed easily through the cervix and a mod amt of tissue was obtained in two sweeps  The patient tolerated well  Hemostasis noted  All instruments removed  Tissue sent to path  Reviewed reasons to call

## 2019-02-13 ENCOUNTER — TELEPHONE (OUTPATIENT)
Dept: OBGYN CLINIC | Facility: CLINIC | Age: 54
End: 2019-02-13

## 2019-02-13 NOTE — TELEPHONE ENCOUNTER
----- Message from Philip Velázquez, [address] sent at 2/11/2019  5:30 PM EST -----  Benign EMB   Please notify patient   Proceed with preop to discuss Monik

## 2019-06-03 ENCOUNTER — TELEPHONE (OUTPATIENT)
Dept: OBGYN CLINIC | Facility: CLINIC | Age: 54
End: 2019-06-03

## 2019-06-24 ENCOUNTER — OFFICE VISIT (OUTPATIENT)
Dept: FAMILY MEDICINE CLINIC | Facility: MEDICAL CENTER | Age: 54
End: 2019-06-24
Payer: COMMERCIAL

## 2019-06-24 ENCOUNTER — APPOINTMENT (OUTPATIENT)
Dept: RADIOLOGY | Facility: MEDICAL CENTER | Age: 54
End: 2019-06-24
Payer: COMMERCIAL

## 2019-06-24 VITALS
BODY MASS INDEX: 24.19 KG/M2 | HEART RATE: 74 BPM | DIASTOLIC BLOOD PRESSURE: 70 MMHG | SYSTOLIC BLOOD PRESSURE: 110 MMHG | WEIGHT: 128 LBS | OXYGEN SATURATION: 98 %

## 2019-06-24 DIAGNOSIS — M25.572 ACUTE LEFT ANKLE PAIN: ICD-10-CM

## 2019-06-24 DIAGNOSIS — B35.1 NAIL FUNGUS: Primary | ICD-10-CM

## 2019-06-24 PROCEDURE — 1036F TOBACCO NON-USER: CPT | Performed by: FAMILY MEDICINE

## 2019-06-24 PROCEDURE — 99213 OFFICE O/P EST LOW 20 MIN: CPT | Performed by: FAMILY MEDICINE

## 2019-06-24 PROCEDURE — 73610 X-RAY EXAM OF ANKLE: CPT

## 2019-06-25 ENCOUNTER — TELEPHONE (OUTPATIENT)
Dept: FAMILY MEDICINE CLINIC | Facility: MEDICAL CENTER | Age: 54
End: 2019-06-25

## 2019-12-31 ENCOUNTER — OFFICE VISIT (OUTPATIENT)
Dept: OBGYN CLINIC | Facility: MEDICAL CENTER | Age: 54
End: 2019-12-31
Payer: COMMERCIAL

## 2019-12-31 VITALS — SYSTOLIC BLOOD PRESSURE: 110 MMHG | WEIGHT: 128.2 LBS | BODY MASS INDEX: 24.22 KG/M2 | DIASTOLIC BLOOD PRESSURE: 78 MMHG

## 2019-12-31 DIAGNOSIS — N89.8 VAGINAL DISCHARGE: Primary | ICD-10-CM

## 2019-12-31 LAB — SL AMB POCT WET MOUNT: ABNORMAL

## 2019-12-31 PROCEDURE — 87210 SMEAR WET MOUNT SALINE/INK: CPT | Performed by: NURSE PRACTITIONER

## 2019-12-31 PROCEDURE — 99213 OFFICE O/P EST LOW 20 MIN: CPT | Performed by: NURSE PRACTITIONER

## 2019-12-31 RX ORDER — METRONIDAZOLE 500 MG/1
TABLET ORAL
Qty: 4 TABLET | Refills: 0 | Status: SHIPPED | OUTPATIENT
Start: 2019-12-31 | End: 2019-12-31

## 2019-12-31 NOTE — PATIENT INSTRUCTIONS
Trichomoniasis    WHAT YOU NEED TO KNOW:   What is trichomoniasis? Trichomoniasis is a common sexually transmitted infection (STI)  It is spread between people during sex or genital contact  Trichomoniasis is caused by tiny parasites that are too small to be seen  What are the signs and symptoms of trichomoniasis? You may not have any symptoms  If you have symptoms, they usually appear 5 to 28 days after you were exposed  If young children and teenagers get trichomoniasis, it may be a sign of sexual abuse  Tell a healthcare provider immediately if you suspect sexual abuse  · In women: You may notice a discharge from your vagina that is bad smelling, bubbly, or yellow or green  Your vagina may itch or hurt  You may have pain when you urinate and feel like you need to urinate often  You may also have lower stomach pain or pain during sex  · In men: You may feel irritation, burning, or pain in your penis during or after urinating or ejaculating  You may have an unusual discharge from your penis  Many men do not have signs or symptoms  Even if you have no symptoms, you are still able to spread the infection to your partner  How is trichomoniasis diagnosed? Your healthcare provider will ask questions about your sexual history  Tell him if you have a history of STIs  This will help determine the cause of your symptoms  You may need the following:  · Pelvic exam:  This is also called an internal or vaginal exam  Your healthcare provider will gently put a warmed speculum into your vagina  A speculum is a tool that opens your vagina  Your healthcare provider will look to see if you have red patches on your cervix or vagina  This may mean you have trichomoniasis  · Discharge sample and culture: If you have discharge from your penis or vagina, your healthcare provider may take a sample of it to test for the parasite that causes trichomoniasis  How is trichomoniasis treated?   You will need to take antibiotic medicine to kill the parasite, even if you have no symptoms  Tell your sex partners that you have trichomoniasis  Anyone you have had sex with recently must also be treated  If your partner is not treated, they may give the infection back to you or infect someone else  Take your medicine as ordered until it is gone, even if you feel better  Do not have sex until both you and your partner have taken all of your medicine, and your symptoms are gone  Ask your healthcare provider when it is safe to have sex again  How can I prevent another trichomoniasis infection? You can get trichomoniasis more than once  Limit the number of sexual partners you have to decrease your risk for another infection  Do not have unprotected sex (including oral sex)  Always wear a latex condom during sex to prevent trichomoniasis and other STIs  Use a new condom after each ejaculation  What are the risks of trichomoniasis? Trichomoniasis increases the risk that other STIs, including HIV, will be spread from person to person  Tell your healthcare provider if you know or think you are pregnant  A trichomoniasis infection can be dangerous for a pregnant woman  It may cause your water to break too soon, or your baby to be born too early or too small  Where can I find support and more information? · Division of STD Prevention, Centers for Disease Control and Prevention  [facility] , [address] [place] [address]  Phone: 1- 196 - 167-6728  Web Address: [employer] au  · [id number] Liz Ramirez (KELLIE)  P O  [address]  Web Address: http://[employer]/  org  CARE AGREEMENT:   You have the right to help plan your care  Learn about your health condition and how it may be treated  Discuss treatment options with your caregivers to decide what care you want to receive  You always have the right to refuse treatment  The above information is an  only   It is not intended as medical advice for individual conditions or treatments  Talk to your doctor, nurse or pharmacist before following any medical regimen to see if it is safe and effective for you  © 2017 [address] Information is for End User's use only and may not be sold, redistributed or otherwise used for commercial purposes  All illustrations and images included in CareNotes® are the copyrighted property of A D A M , Inc  or Felice Sousa

## 2019-12-31 NOTE — ASSESSMENT & PLAN NOTE
Exam consistent with trichomoniasis  Rx Flagyl 2 gm as single dose  Aware to avoid alcohol x 24 hours  Advised that this is an STI and any partner needs to be treated  Advised to RTO if symptoms do no improve for affirm cultures  Advised condoms for all sexual encounters

## 2019-12-31 NOTE — PROGRESS NOTES
Assessment/Plan:    Vaginal discharge  Exam consistent with trichomoniasis  Rx Flagyl 2 gm as single dose  Aware to avoid alcohol x 24 hours  Advised that this is an STI and any partner needs to be treated  Advised to RTO if symptoms do no improve for affirm cultures  Advised condoms for all sexual encounters  Diagnoses and all orders for this visit:    Vaginal discharge  -     metroNIDAZOLE (FLAGYL) 500 mg tablet; Take all 4 pills together at the same time  -     POCT wet mount        Subjective:      Patient ID: Misbah Mensah is a 49 y o  female  Cal Rivero is a 49year old who presents with complaints of vaginal discharge x 2 weeks  States discharge is yellow, and thin  She has slight itching and denies odor  She states she is not sexually active and has not been "for a while "  She denies S/S of UTI or pelvic pain  She tried OTC monistat with minimal relief-last used 1 week ago  She did use hot tub with coworkers in early December when traveling for work  The following portions of the patient's history were reviewed and updated as appropriate: allergies, current medications, past family history, past medical history, past social history, past surgical history and problem list     Review of Systems   Constitutional: Negative  Negative for fatigue and fever  Gastrointestinal: Negative  Negative for abdominal pain  Endocrine: Negative  Genitourinary: Positive for vaginal discharge  Negative for difficulty urinating, dysuria, frequency and pelvic pain  Allergic/Immunologic: Negative  Neurological: Negative  Objective:      /78 (BP Location: Left arm, Patient Position: Sitting, Cuff Size: Standard)   Wt 58 2 kg (128 lb 3 2 oz)   LMP 11/04/2019   BMI 24 22 kg/m²          Physical Exam   Constitutional: She is oriented to person, place, and time  She appears well-developed and well-nourished  HENT:   Head: Normocephalic  Neck: Normal range of motion  Abdominal: Soft  There is no tenderness  Genitourinary: Uterus normal  There is no rash or tenderness on the right labia  There is no rash or tenderness on the left labia  Cervix exhibits no motion tenderness, no discharge and no friability  Right adnexum displays no tenderness  Left adnexum displays no tenderness  Vaginal discharge found  Neurological: She is alert and oriented to person, place, and time  Skin: Skin is warm and dry  Capillary refill takes less than 2 seconds  Psychiatric: She has a normal mood and affect         We prep-+ WBC, motile trich, PH 5 0, neg clue, yeast or whiff

## 2020-01-14 ENCOUNTER — APPOINTMENT (OUTPATIENT)
Dept: RADIOLOGY | Facility: MEDICAL CENTER | Age: 55
End: 2020-01-14
Payer: COMMERCIAL

## 2020-01-14 ENCOUNTER — OFFICE VISIT (OUTPATIENT)
Dept: FAMILY MEDICINE CLINIC | Facility: MEDICAL CENTER | Age: 55
End: 2020-01-14
Payer: COMMERCIAL

## 2020-01-14 VITALS
BODY MASS INDEX: 24.68 KG/M2 | OXYGEN SATURATION: 98 % | DIASTOLIC BLOOD PRESSURE: 76 MMHG | HEART RATE: 74 BPM | SYSTOLIC BLOOD PRESSURE: 118 MMHG | WEIGHT: 130.6 LBS

## 2020-01-14 DIAGNOSIS — M53.3 SACRAL BACK PAIN: ICD-10-CM

## 2020-01-14 DIAGNOSIS — M54.16 LUMBAR BACK PAIN WITH RADICULOPATHY AFFECTING RIGHT LOWER EXTREMITY: ICD-10-CM

## 2020-01-14 DIAGNOSIS — M54.16 LUMBAR BACK PAIN WITH RADICULOPATHY AFFECTING LEFT LOWER EXTREMITY: Primary | ICD-10-CM

## 2020-01-14 DIAGNOSIS — M54.16 LUMBAR BACK PAIN WITH RADICULOPATHY AFFECTING LEFT LOWER EXTREMITY: ICD-10-CM

## 2020-01-14 PROBLEM — J45.991 COUGH VARIANT ASTHMA: Status: ACTIVE | Noted: 2017-03-17

## 2020-01-14 PROBLEM — J45.991 COUGH VARIANT ASTHMA: Status: RESOLVED | Noted: 2017-03-17 | Resolved: 2020-01-14

## 2020-01-14 PROCEDURE — 72220 X-RAY EXAM SACRUM TAILBONE: CPT

## 2020-01-14 PROCEDURE — 99214 OFFICE O/P EST MOD 30 MIN: CPT | Performed by: FAMILY MEDICINE

## 2020-01-14 PROCEDURE — 72110 X-RAY EXAM L-2 SPINE 4/>VWS: CPT

## 2020-01-14 RX ORDER — PREDNISONE 10 MG/1
40 TABLET ORAL DAILY
Qty: 20 TABLET | Refills: 0 | Status: SHIPPED | OUTPATIENT
Start: 2020-01-14 | End: 2020-01-19

## 2020-01-14 RX ORDER — CYCLOBENZAPRINE HCL 5 MG
TABLET ORAL
Qty: 20 TABLET | Refills: 0 | Status: SHIPPED | OUTPATIENT
Start: 2020-01-14 | End: 2021-07-12
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
## 2020-01-15 ENCOUNTER — ANNUAL EXAM (OUTPATIENT)
Dept: OBGYN CLINIC | Facility: MEDICAL CENTER | Age: 55
End: 2020-01-15
Payer: COMMERCIAL

## 2020-01-15 VITALS
WEIGHT: 130 LBS | HEIGHT: 62 IN | DIASTOLIC BLOOD PRESSURE: 72 MMHG | BODY MASS INDEX: 23.92 KG/M2 | SYSTOLIC BLOOD PRESSURE: 124 MMHG

## 2020-01-15 DIAGNOSIS — Z01.419 ENCOUNTER FOR GYNECOLOGICAL EXAMINATION (GENERAL) (ROUTINE) WITHOUT ABNORMAL FINDINGS: Primary | ICD-10-CM

## 2020-01-15 DIAGNOSIS — N89.8 VAGINAL DISCHARGE: ICD-10-CM

## 2020-01-15 DIAGNOSIS — Z11.3 SCREEN FOR SEXUALLY TRANSMITTED DISEASES: ICD-10-CM

## 2020-01-15 DIAGNOSIS — Z12.31 ENCOUNTER FOR SCREENING MAMMOGRAM FOR MALIGNANT NEOPLASM OF BREAST: ICD-10-CM

## 2020-01-15 PROBLEM — N92.6 IRREGULAR MENSES: Status: ACTIVE | Noted: 2020-01-15

## 2020-01-15 PROBLEM — N63.0 BREAST LUMP: Status: RESOLVED | Noted: 2018-07-03 | Resolved: 2020-01-15

## 2020-01-15 PROBLEM — N92.0 MENORRHAGIA WITH REGULAR CYCLE: Status: RESOLVED | Noted: 2019-02-06 | Resolved: 2020-01-15

## 2020-01-15 PROBLEM — N60.02: Status: RESOLVED | Noted: 2018-07-09 | Resolved: 2020-01-15

## 2020-01-15 PROCEDURE — S0612 ANNUAL GYNECOLOGICAL EXAMINA: HCPCS | Performed by: NURSE PRACTITIONER

## 2020-01-15 PROCEDURE — 87661 TRICHOMONAS VAGINALIS AMPLIF: CPT | Performed by: NURSE PRACTITIONER

## 2020-01-15 NOTE — ASSESSMENT & PLAN NOTE
C/o continued abn discharge following Flagyl for trichomonas  Wet mount with neg trich, neg clue and neg yeast  PH 4 5  Neg whiff  Trich culture sent  Gc/chl were recommended and she is agreeable to this   Will advise as indicated with results

## 2020-01-15 NOTE — ASSESSMENT & PLAN NOTE
Benign findings on routine gyn exam; see additionsl notes re: abn appearing discharge  Recommended monthly SBE, annual CBE and annual screening mammo  ASCCP guidelines reviewed and pap with cotesting noted to be up to date  Baseline colonoscopy was normal in 2018  The patient denies STI risk factors and she agrees to gc/chl testing at this time; she declines serum studies  Reviewed diet/activity recommendations:  Encouraged daily Ca++ and vitamin D intake as well as daily weight bearing exercise for promotion of bone health  Discussed perimenopausal considerations and symptoms to report  RTO in one year for routine annual gyn exam or sooner PRN

## 2020-01-15 NOTE — PROGRESS NOTES
Assessment/Plan:    Encounter for gynecological examination (general) (routine) without abnormal findings  Benign findings on routine gyn exam; see additionsl notes re: abn appearing discharge  Recommended monthly SBE, annual CBE and annual screening mammo  ASCCP guidelines reviewed and pap with cotesting noted to be up to date  Baseline colonoscopy was normal in 2018  The patient denies STI risk factors and she agrees to gc/chl testing at this time; she declines serum studies  Reviewed diet/activity recommendations:  Encouraged daily Ca++ and vitamin D intake as well as daily weight bearing exercise for promotion of bone health  Discussed perimenopausal considerations and symptoms to report  RTO in one year for routine annual gyn exam or sooner PRN  Vaginal discharge  C/o continued abn discharge following Flagyl for trichomonas  Wet mount with neg trich, neg clue and neg yeast  PH 4 5  Neg whiff  Trich culture sent  Gc/chl were recommended and she is agreeable to this  Will advise as indicated with results         Diagnoses and all orders for this visit:    Encounter for gynecological examination (general) (routine) without abnormal findings    Encounter for screening mammogram for malignant neoplasm of breast  -     Mammo screening bilateral w 3d & cad; Future    Screen for sexually transmitted diseases  -     Trichomonas Vaginalis, VANESSA  -     Chlamydia/GC amplified DNA by PCR    Vaginal discharge          Subjective:      Patient ID: Robert Lu is a 49 y o  female  This patient presents for routine annual gyn exam    Medically stable  Trich was dx'd 12/2019 - s/p flagyl  She continues to note discharge and slight irritation at times  States she is not sexually active but reports last encounter was 2 mos ago - no partner currently  She denies acute gyn complaints  Menses are unpredictable at times and light to mod   She denies VM sx, pelvic pain, breast concerns, abnormal discharge, bowel/bladder dysfunction, depression/anxiety  Not using contraception  The following portions of the patient's history were reviewed and updated as appropriate: allergies, current medications, past family history, past medical history, past social history, past surgical history and problem list     Review of Systems   Constitutional: Negative  Respiratory: Negative  Cardiovascular: Negative  Gastrointestinal: Negative  Genitourinary: Positive for vaginal discharge  Negative for dysuria, frequency, pelvic pain and vaginal bleeding  Musculoskeletal: Negative  Skin: Negative  Neurological: Negative  Psychiatric/Behavioral: Negative  Objective:      /72 (BP Location: Right arm, Patient Position: Sitting, Cuff Size: Standard)   Ht 5' 2" (1 575 m)   Wt 59 kg (130 lb)   BMI 23 78 kg/m²          Physical Exam   Constitutional: She is oriented to person, place, and time  She appears well-developed and well-nourished  HENT:   Head: Normocephalic and atraumatic  Eyes: Pupils are equal, round, and reactive to light  EOM are normal    Neck: Normal range of motion  Neck supple  No thyromegaly present  Cardiovascular: Normal rate, regular rhythm and normal heart sounds  Pulmonary/Chest: Effort normal and breath sounds normal  No respiratory distress  She has no wheezes  She has no rales  She exhibits no mass, no tenderness and no deformity  Right breast exhibits no inverted nipple, no mass, no nipple discharge, no skin change and no tenderness  Left breast exhibits no inverted nipple, no mass, no nipple discharge, no skin change and no tenderness  No breast tenderness or discharge  Breasts are symmetrical    Abdominal: Soft  She exhibits no distension and no mass  There is no splenomegaly or hepatomegaly  There is no tenderness  There is no rebound and no guarding  Genitourinary: Rectum normal and uterus normal  No breast tenderness or discharge  No labial fusion   There is no rash, tenderness, lesion or injury on the right labia  There is no rash, tenderness, lesion or injury on the left labia  Cervix exhibits no motion tenderness, no discharge and no friability  Right adnexum displays no mass, no tenderness and no fullness  Left adnexum displays no mass, no tenderness and no fullness  No erythema, tenderness or bleeding in the vagina  No foreign body in the vagina  Vaginal discharge found  Musculoskeletal: Normal range of motion  Lymphadenopathy:     She has no cervical adenopathy  She has no axillary adenopathy  Neurological: She is alert and oriented to person, place, and time  No cranial nerve deficit  Skin: Skin is warm and dry  No rash noted  No cyanosis  Nails show no clubbing  Psychiatric: She has a normal mood and affect   Her speech is normal and behavior is normal  Judgment and thought content normal  Cognition and memory are normal

## 2020-01-17 LAB
C TRACH DNA SPEC QL NAA+PROBE: ABNORMAL
N GONORRHOEA DNA SPEC QL NAA+PROBE: ABNORMAL

## 2020-01-18 LAB — T VAGINALIS RRNA SPEC QL NAA+PROBE: NEGATIVE

## 2020-01-20 ENCOUNTER — TELEPHONE (OUTPATIENT)
Dept: OBGYN CLINIC | Facility: CLINIC | Age: 55
End: 2020-01-20

## 2020-01-20 ENCOUNTER — TELEPHONE (OUTPATIENT)
Dept: FAMILY MEDICINE CLINIC | Facility: MEDICAL CENTER | Age: 55
End: 2020-01-20

## 2020-01-20 DIAGNOSIS — Z72.51 HIGH RISK SEXUAL BEHAVIOR, UNSPECIFIED TYPE: Primary | ICD-10-CM

## 2020-01-20 NOTE — TELEPHONE ENCOUNTER
----- Message from Ricardo Post, [address] sent at 1/20/2020 11:35 AM EST -----  Trichomonas culture is negative  Gonorrhea and chlamydia could not be run off the specimen due to contaminants - please provide an order for gc/chl off urine, which she may submit to outpatient lab

## 2020-01-20 NOTE — TELEPHONE ENCOUNTER
Lumbar x-ray reviewed  Arthritis is present  Sacrum/coccyx x-ray reviewed  Arthritis is present here as well  Continue with physical therapy as recommended  If symptoms persist into February patient should make a follow-up appointment

## 2020-01-22 ENCOUNTER — TELEPHONE (OUTPATIENT)
Dept: OBGYN CLINIC | Facility: CLINIC | Age: 55
End: 2020-01-22

## 2020-01-27 ENCOUNTER — APPOINTMENT (OUTPATIENT)
Dept: LAB | Facility: MEDICAL CENTER | Age: 55
End: 2020-01-27
Payer: COMMERCIAL

## 2020-01-27 DIAGNOSIS — Z72.51 HIGH RISK SEXUAL BEHAVIOR, UNSPECIFIED TYPE: ICD-10-CM

## 2020-01-27 LAB
C TRACH DNA SPEC QL NAA+PROBE: NEGATIVE
N GONORRHOEA DNA SPEC QL NAA+PROBE: NEGATIVE

## 2020-01-27 PROCEDURE — 87491 CHLMYD TRACH DNA AMP PROBE: CPT

## 2020-01-27 PROCEDURE — 87591 N.GONORRHOEAE DNA AMP PROB: CPT

## 2020-02-07 ENCOUNTER — TELEPHONE (OUTPATIENT)
Dept: FAMILY MEDICINE CLINIC | Facility: MEDICAL CENTER | Age: 55
End: 2020-02-07

## 2020-02-07 NOTE — TELEPHONE ENCOUNTER
Pt called to ask advice  She said she would not normally worry about cold symptoms, which she has had for about 3 days, but she has been traveling by plane within the [place] and she doesn't know if she should be concerned because of that      Routed to Clinical - please triage

## 2020-02-11 ENCOUNTER — OFFICE VISIT (OUTPATIENT)
Dept: FAMILY MEDICINE CLINIC | Facility: MEDICAL CENTER | Age: 55
End: 2020-02-11
Payer: COMMERCIAL

## 2020-02-11 VITALS
HEART RATE: 60 BPM | DIASTOLIC BLOOD PRESSURE: 68 MMHG | HEIGHT: 62 IN | OXYGEN SATURATION: 100 % | TEMPERATURE: 97.7 F | RESPIRATION RATE: 16 BRPM | WEIGHT: 130 LBS | SYSTOLIC BLOOD PRESSURE: 106 MMHG | BODY MASS INDEX: 23.92 KG/M2

## 2020-02-11 DIAGNOSIS — J01.90 ACUTE NON-RECURRENT SINUSITIS, UNSPECIFIED LOCATION: Primary | ICD-10-CM

## 2020-02-11 PROCEDURE — 1036F TOBACCO NON-USER: CPT | Performed by: FAMILY MEDICINE

## 2020-02-11 PROCEDURE — 99214 OFFICE O/P EST MOD 30 MIN: CPT | Performed by: FAMILY MEDICINE

## 2020-02-11 PROCEDURE — 3008F BODY MASS INDEX DOCD: CPT | Performed by: FAMILY MEDICINE

## 2020-02-11 RX ORDER — AZITHROMYCIN 250 MG/1
TABLET, FILM COATED ORAL
Qty: 6 TABLET | Refills: 0 | Status: SHIPPED | OUTPATIENT
Start: 2020-02-11 | End: 2020-02-15

## 2020-02-11 NOTE — PROGRESS NOTES
Assessment/Plan:       Diagnoses and all orders for this visit:    Acute non-recurrent sinusitis, unspecified location  -     azithromycin (ZITHROMAX) 250 mg tablet; Take 2 tablets today then 1 tablet daily x 4 days    New onset sinusitis requiring treatment  Highly suspicious for bacterial based on symptoms and exam findings  Will have patient start azithromycin for five days  Follow-up in one week if symptoms persist or sooner if needed  Subjective:      Patient ID: Yue Calix is a 49 y o  female  Patient presents with sinus pain  Started one week ago  Location is the forehead, bilateral cheeks and around the eyes  Described as a pressure sensation  Has associated nasal congestion, cough and sore throat  Symptoms have been getting progressively worse  Pain is exacerbated by bending forward  Pain is improved by applying heat to the face  The following portions of the patient's history were reviewed and updated as appropriate: She  has a past medical history of Anemia, ASCUS with positive high risk HPV cervical (2015), Asthma, Headache, History of colonic polyps, HPV (human papilloma virus) infection, Hyperlipidemia, Kidney stone, and Uterine fibroid  She   Patient Active Problem List    Diagnosis Date Noted    Irregular menses 01/15/2020    Vaginal discharge 12/31/2019    Elevated hemoglobin A1c 01/03/2019    Fibroid 01/02/2019    Encounter for gynecological examination (general) (routine) without abnormal findings 01/02/2019    Dry eye syndrome 10/25/2017    Headache 10/25/2017    Elevated cholesterol 10/25/2017    Right shoulder pain 10/25/2017     She  has a past surgical history that includes Colposcopy; Endometrial biopsy (04/28/2015); Tubal ligation; Lithotripsy; pr colonoscopy flx dx w/collj spec when pfrmd (N/A, 12/5/2017); Colposcopy w/ biopsy / curettage (04/28/2015); Breast surgery; Breast biopsy (Left);  Colonoscopy; Dilation and curettage of uterus; Bone biopsy (Left, 6/4/2018); and Foot surgery  Her family history includes Arthritis in her mother; Diabetes in her father; Hypertension in her brother, maternal grandfather, maternal grandmother, and mother; No Known Problems in her brother, brother, brother, paternal grandfather, paternal grandmother, son, and son; Prostate cancer in her maternal uncle  She  reports that she has never smoked  She has never used smokeless tobacco  She reports that she does not drink alcohol or use drugs  Current Outpatient Medications   Medication Sig Dispense Refill    cycloSPORINE (RESTASIS) 0 05 % ophthalmic emulsion 1 drop 2 (two) times a day      multivitamin (THERAGRAN) TABS Take 1 tablet by mouth daily      azithromycin (ZITHROMAX) 250 mg tablet Take 2 tablets today then 1 tablet daily x 4 days 6 tablet 0    cyclobenzaprine (FLEXERIL) 5 mg tablet Take 1-2 tabs po qhs prn pain (Patient not taking: Reported on 2/11/2020) 20 tablet 0     No current facility-administered medications for this visit  Current Outpatient Medications on File Prior to Visit   Medication Sig    cycloSPORINE (RESTASIS) 0 05 % ophthalmic emulsion 1 drop 2 (two) times a day    multivitamin (THERAGRAN) TABS Take 1 tablet by mouth daily    cyclobenzaprine (FLEXERIL) 5 mg tablet Take 1-2 tabs po qhs prn pain (Patient not taking: Reported on 2/11/2020)     No current facility-administered medications on file prior to visit  She is allergic to penicillins       Review of Systems   Constitutional: Negative for fever  Respiratory: Negative for shortness of breath  Cardiovascular: Negative for chest pain  Objective:      /68 (BP Location: Left arm, Patient Position: Sitting, Cuff Size: Adult)   Pulse 60   Temp 97 7 °F (36 5 °C) (Oral)   Resp 16   Ht 5' 2" (1 575 m)   Wt 59 kg (130 lb)   SpO2 100%   BMI 23 78 kg/m²          Physical Exam   Constitutional: Vital signs are normal  She appears well-developed and well-nourished  HENT:   Head: Normocephalic and atraumatic  Right Ear: Tympanic membrane, external ear and ear canal normal  Right ear middle ear effusion: Serous fluid of the middle ear  Left Ear: Tympanic membrane, external ear and ear canal normal  Left ear middle ear effusion: Serous fluid of the middle ear  Nose: Mucosal edema and rhinorrhea present  Right sinus exhibits maxillary sinus tenderness and frontal sinus tenderness  Left sinus exhibits maxillary sinus tenderness and frontal sinus tenderness  Mouth/Throat: Uvula is midline and mucous membranes are normal  No oropharyngeal exudate (Postnasal drainage is however present  )  Eyes: Pupils are equal, round, and reactive to light  Conjunctivae, EOM and lids are normal    Neck: Trachea normal    Cardiovascular: Normal rate, regular rhythm and normal heart sounds  No murmur heard  Pulmonary/Chest: Effort normal and breath sounds normal    Lymphadenopathy:     She has cervical adenopathy  Right cervical: Superficial cervical adenopathy present  Left cervical: Superficial cervical adenopathy present  Neurological: She is alert  Skin: Skin is warm  Psychiatric: She has a normal mood and affect

## 2020-02-18 ENCOUNTER — OFFICE VISIT (OUTPATIENT)
Dept: FAMILY MEDICINE CLINIC | Facility: MEDICAL CENTER | Age: 55
End: 2020-02-18
Payer: COMMERCIAL

## 2020-02-18 ENCOUNTER — APPOINTMENT (OUTPATIENT)
Dept: LAB | Facility: MEDICAL CENTER | Age: 55
End: 2020-02-18
Payer: COMMERCIAL

## 2020-02-18 VITALS
SYSTOLIC BLOOD PRESSURE: 118 MMHG | WEIGHT: 128.5 LBS | BODY MASS INDEX: 24.26 KG/M2 | RESPIRATION RATE: 14 BRPM | HEART RATE: 60 BPM | DIASTOLIC BLOOD PRESSURE: 78 MMHG | HEIGHT: 61 IN

## 2020-02-18 DIAGNOSIS — Z23 ENCOUNTER FOR IMMUNIZATION: ICD-10-CM

## 2020-02-18 DIAGNOSIS — Z13.29 SCREENING FOR THYROID DISORDER: ICD-10-CM

## 2020-02-18 DIAGNOSIS — Z00.00 PHYSICAL EXAM, ANNUAL: Primary | ICD-10-CM

## 2020-02-18 DIAGNOSIS — Z13.1 SCREENING FOR DIABETES MELLITUS: ICD-10-CM

## 2020-02-18 DIAGNOSIS — Z13.0 SCREENING, ANEMIA, DEFICIENCY, IRON: ICD-10-CM

## 2020-02-18 DIAGNOSIS — Z13.220 SCREENING FOR LIPID DISORDERS: ICD-10-CM

## 2020-02-18 DIAGNOSIS — R05.9 COUGH: ICD-10-CM

## 2020-02-18 LAB
ALBUMIN SERPL BCP-MCNC: 3.7 G/DL (ref 3.5–5)
ALP SERPL-CCNC: 66 U/L (ref 46–116)
ALT SERPL W P-5'-P-CCNC: 26 U/L (ref 12–78)
ANION GAP SERPL CALCULATED.3IONS-SCNC: 5 MMOL/L (ref 4–13)
AST SERPL W P-5'-P-CCNC: 18 U/L (ref 5–45)
BASOPHILS # BLD AUTO: 0.03 THOUSANDS/ΜL (ref 0–0.1)
BASOPHILS NFR BLD AUTO: 1 % (ref 0–1)
BILIRUB SERPL-MCNC: 0.52 MG/DL (ref 0.2–1)
BUN SERPL-MCNC: 17 MG/DL (ref 5–25)
CALCIUM SERPL-MCNC: 9.4 MG/DL (ref 8.3–10.1)
CHLORIDE SERPL-SCNC: 109 MMOL/L (ref 100–108)
CHOLEST SERPL-MCNC: 225 MG/DL (ref 50–200)
CO2 SERPL-SCNC: 27 MMOL/L (ref 21–32)
CREAT SERPL-MCNC: 0.76 MG/DL (ref 0.6–1.3)
EOSINOPHIL # BLD AUTO: 0.08 THOUSAND/ΜL (ref 0–0.61)
EOSINOPHIL NFR BLD AUTO: 2 % (ref 0–6)
ERYTHROCYTE [DISTWIDTH] IN BLOOD BY AUTOMATED COUNT: 13.9 % (ref 11.6–15.1)
EST. AVERAGE GLUCOSE BLD GHB EST-MCNC: 111 MG/DL
FERRITIN SERPL-MCNC: 65 NG/ML (ref 8–388)
GFR SERPL CREATININE-BSD FRML MDRD: 90 ML/MIN/1.73SQ M
GLUCOSE P FAST SERPL-MCNC: 86 MG/DL (ref 65–99)
HBA1C MFR BLD: 5.5 %
HCT VFR BLD AUTO: 43.6 % (ref 34.8–46.1)
HDLC SERPL-MCNC: 54 MG/DL
HGB BLD-MCNC: 14.3 G/DL (ref 11.5–15.4)
IMM GRANULOCYTES # BLD AUTO: 0.01 THOUSAND/UL (ref 0–0.2)
IMM GRANULOCYTES NFR BLD AUTO: 0 % (ref 0–2)
IRON SATN MFR SERPL: 25 %
IRON SERPL-MCNC: 90 UG/DL (ref 50–170)
LDLC SERPL CALC-MCNC: 149 MG/DL (ref 0–100)
LYMPHOCYTES # BLD AUTO: 1.91 THOUSANDS/ΜL (ref 0.6–4.47)
LYMPHOCYTES NFR BLD AUTO: 40 % (ref 14–44)
MCH RBC QN AUTO: 31.3 PG (ref 26.8–34.3)
MCHC RBC AUTO-ENTMCNC: 32.8 G/DL (ref 31.4–37.4)
MCV RBC AUTO: 95 FL (ref 82–98)
MONOCYTES # BLD AUTO: 0.43 THOUSAND/ΜL (ref 0.17–1.22)
MONOCYTES NFR BLD AUTO: 9 % (ref 4–12)
NEUTROPHILS # BLD AUTO: 2.27 THOUSANDS/ΜL (ref 1.85–7.62)
NEUTS SEG NFR BLD AUTO: 48 % (ref 43–75)
NRBC BLD AUTO-RTO: 0 /100 WBCS
PLATELET # BLD AUTO: 302 THOUSANDS/UL (ref 149–390)
PMV BLD AUTO: 11.7 FL (ref 8.9–12.7)
POTASSIUM SERPL-SCNC: 4.2 MMOL/L (ref 3.5–5.3)
PROT SERPL-MCNC: 8.1 G/DL (ref 6.4–8.2)
RBC # BLD AUTO: 4.57 MILLION/UL (ref 3.81–5.12)
SODIUM SERPL-SCNC: 141 MMOL/L (ref 136–145)
T4 FREE SERPL-MCNC: 1.03 NG/DL (ref 0.76–1.46)
TIBC SERPL-MCNC: 364 UG/DL (ref 250–450)
TRIGL SERPL-MCNC: 111 MG/DL
TSH SERPL DL<=0.05 MIU/L-ACNC: 1.17 UIU/ML (ref 0.36–3.74)
WBC # BLD AUTO: 4.73 THOUSAND/UL (ref 4.31–10.16)

## 2020-02-18 PROCEDURE — 84443 ASSAY THYROID STIM HORMONE: CPT

## 2020-02-18 PROCEDURE — 80053 COMPREHEN METABOLIC PANEL: CPT

## 2020-02-18 PROCEDURE — 84439 ASSAY OF FREE THYROXINE: CPT

## 2020-02-18 PROCEDURE — 83550 IRON BINDING TEST: CPT

## 2020-02-18 PROCEDURE — 83540 ASSAY OF IRON: CPT

## 2020-02-18 PROCEDURE — 3008F BODY MASS INDEX DOCD: CPT | Performed by: FAMILY MEDICINE

## 2020-02-18 PROCEDURE — 82728 ASSAY OF FERRITIN: CPT

## 2020-02-18 PROCEDURE — 85025 COMPLETE CBC W/AUTO DIFF WBC: CPT

## 2020-02-18 PROCEDURE — 90715 TDAP VACCINE 7 YRS/> IM: CPT | Performed by: FAMILY MEDICINE

## 2020-02-18 PROCEDURE — 90471 IMMUNIZATION ADMIN: CPT | Performed by: FAMILY MEDICINE

## 2020-02-18 PROCEDURE — 99396 PREV VISIT EST AGE 40-64: CPT | Performed by: FAMILY MEDICINE

## 2020-02-18 PROCEDURE — 83036 HEMOGLOBIN GLYCOSYLATED A1C: CPT

## 2020-02-18 PROCEDURE — 80061 LIPID PANEL: CPT

## 2020-02-18 PROCEDURE — 36415 COLL VENOUS BLD VENIPUNCTURE: CPT

## 2020-02-18 RX ORDER — PREDNISONE 10 MG/1
40 TABLET ORAL DAILY
Qty: 20 TABLET | Refills: 0 | Status: SHIPPED | OUTPATIENT
Start: 2020-02-18 | End: 2020-02-23

## 2020-02-18 NOTE — PROGRESS NOTES
Assessment/Plan:       Diagnoses and all orders for this visit:    Physical exam, annual  52-year-old female presenting for a physical exam     Cough  -     predniSONE 10 mg tablet; Take 4 tablets (40 mg total) by mouth daily for 5 days  May be mild reactive airway disease secondary to recent infection  Will have patient start a course of prednisone  If cough persists patient instructed to call the office and at that time a chest x-ray will be obtained  Encounter for immunization  -     TDAP VACCINE GREATER THAN OR EQUAL TO 8YO IM  Tdap vaccine given and tolerated well  Flu vaccine was recommended and patient was encouraged to get that from her local pharmacy  Screening for lipid disorders  -     Lipid Panel with Direct LDL reflex; Future  Screening for diabetes mellitus  -     Comprehensive metabolic panel; Future  -     Hemoglobin A1C; Future  Screening for thyroid disorder  -     T4, free; Future  -     TSH, 3rd generation; Future  Screening, anemia, deficiency, iron  -     CBC and differential; Future  -     Iron Panel (Includes Ferritin, Iron Sat%, Iron, and TIBC); Future  Labs to screen for lipid disorders, diabetes, thyroid disorder and anemia  Follow-up in one year or sooner if needed  Subjective:      Patient ID: Bre Alvarez is a 49 y o  female  Patient presents for a physical exam   She tells me she eats healthy  She exercise regularly  Does not smoke  No alcohol use  No drug use  Does have a gynecologist   Does get regular mammograms  Has had a colonoscopy  Does have a lingering cough from last week when she was treated for a sinus infection  She otherwise has no new concerns  Agreeable to labs          The following portions of the patient's history were reviewed and updated as appropriate: She  has a past medical history of Anemia, ASCUS with positive high risk HPV cervical (2015), Asthma, Headache, History of colonic polyps, HPV (human papilloma virus) infection, Hyperlipidemia, Kidney stone, and Uterine fibroid  She   Patient Active Problem List    Diagnosis Date Noted    Irregular menses 01/15/2020    Vaginal discharge 12/31/2019    Elevated hemoglobin A1c 01/03/2019    Fibroid 01/02/2019    Encounter for gynecological examination (general) (routine) without abnormal findings 01/02/2019    Dry eye syndrome 10/25/2017    Headache 10/25/2017    Elevated cholesterol 10/25/2017    Right shoulder pain 10/25/2017     She  has a past surgical history that includes Colposcopy; Endometrial biopsy (04/28/2015); Tubal ligation; Lithotripsy; pr colonoscopy flx dx w/collj spec when pfrmd (N/A, 12/5/2017); Colposcopy w/ biopsy / curettage (04/28/2015); Breast surgery; Breast biopsy (Left); Colonoscopy; Dilation and curettage of uterus; Bone biopsy (Left, 6/4/2018); and Foot surgery  Her family history includes Arthritis in her mother; Diabetes in her father; Hypertension in her brother, maternal grandfather, maternal grandmother, and mother; No Known Problems in her brother, brother, brother, paternal grandfather, paternal grandmother, son, and son; Prostate cancer in her maternal uncle  She  reports that she has never smoked  She has never used smokeless tobacco  She reports that she does not drink alcohol or use drugs  Current Outpatient Medications   Medication Sig Dispense Refill    cyclobenzaprine (FLEXERIL) 5 mg tablet Take 1-2 tabs po qhs prn pain 20 tablet 0    cycloSPORINE (RESTASIS) 0 05 % ophthalmic emulsion 1 drop 2 (two) times a day      multivitamin (THERAGRAN) TABS Take 1 tablet by mouth daily      predniSONE 10 mg tablet Take 4 tablets (40 mg total) by mouth daily for 5 days 20 tablet 0     No current facility-administered medications for this visit        Current Outpatient Medications on File Prior to Visit   Medication Sig    cyclobenzaprine (FLEXERIL) 5 mg tablet Take 1-2 tabs po qhs prn pain    cycloSPORINE (RESTASIS) 0 05 % ophthalmic emulsion 1 drop 2 (two) times a day    multivitamin (THERAGRAN) TABS Take 1 tablet by mouth daily     No current facility-administered medications on file prior to visit  She is allergic to penicillins       Review of Systems   Constitutional: Negative for fever  Respiratory: Positive for cough  Negative for shortness of breath  Cardiovascular: Negative for chest pain  Gastrointestinal: Negative for abdominal pain and blood in stool  Genitourinary: Negative for dysuria and hematuria  Musculoskeletal: Negative for arthralgias and myalgias  Skin: Negative for rash  Neurological: Negative for headaches  Objective:      /78 (Cuff Size: Standard)   Pulse 60   Resp 14   Ht 5' 1 25" (1 556 m)   Wt 58 3 kg (128 lb 8 oz)   BMI 24 08 kg/m²          Physical Exam   Constitutional: She is oriented to person, place, and time  Vital signs are normal  She appears well-developed and well-nourished  HENT:   Head: Normocephalic and atraumatic  Right Ear: Tympanic membrane, external ear and ear canal normal    Left Ear: Tympanic membrane, external ear and ear canal normal    Nose: Nose normal    Mouth/Throat: Uvula is midline, oropharynx is clear and moist and mucous membranes are normal    Eyes: Pupils are equal, round, and reactive to light  Conjunctivae, EOM and lids are normal    Neck: Trachea normal  Neck supple  No thyromegaly present  Cardiovascular: Normal rate, regular rhythm, S1 normal and S2 normal    No murmur heard  Pulmonary/Chest: Effort normal and breath sounds normal    Abdominal: Soft  Bowel sounds are normal  There is no tenderness  Lymphadenopathy:     She has no cervical adenopathy  Neurological: She is alert and oriented to person, place, and time  No cranial nerve deficit  Skin: Skin is warm, dry and intact  Psychiatric: She has a normal mood and affect   Her speech is normal and behavior is normal  Thought content normal

## 2020-03-27 ENCOUNTER — HOSPITAL ENCOUNTER (OUTPATIENT)
Dept: RADIOLOGY | Facility: MEDICAL CENTER | Age: 55
Discharge: HOME | End: 2020-03-27

## 2020-06-24 ENCOUNTER — HOSPITAL ENCOUNTER (OUTPATIENT)
Dept: RADIOLOGY | Facility: MEDICAL CENTER | Age: 55
Discharge: HOME | End: 2020-06-24
Payer: COMMERCIAL

## 2020-06-24 VITALS — BODY MASS INDEX: 24.17 KG/M2 | WEIGHT: 128 LBS | HEIGHT: 61 IN

## 2020-06-24 DIAGNOSIS — Z12.31 ENCOUNTER FOR SCREENING MAMMOGRAM FOR MALIGNANT NEOPLASM OF BREAST: ICD-10-CM

## 2020-06-24 PROCEDURE — 77063 BREAST TOMOSYNTHESIS BI: CPT

## 2020-06-24 PROCEDURE — 77067 SCR MAMMO BI INCL CAD: CPT

## 2020-11-11 ENCOUNTER — OFFICE VISIT (OUTPATIENT)
Dept: OBGYN CLINIC | Facility: MEDICAL CENTER | Age: 55
End: 2020-11-11
Payer: COMMERCIAL

## 2020-11-11 VITALS
DIASTOLIC BLOOD PRESSURE: 80 MMHG | SYSTOLIC BLOOD PRESSURE: 112 MMHG | BODY MASS INDEX: 24 KG/M2 | TEMPERATURE: 97.1 F | WEIGHT: 127 LBS

## 2020-11-11 DIAGNOSIS — Z11.3 SCREENING FOR STD (SEXUALLY TRANSMITTED DISEASE): ICD-10-CM

## 2020-11-11 DIAGNOSIS — N93.0 POSTCOITAL BLEEDING: ICD-10-CM

## 2020-11-11 DIAGNOSIS — N92.6 IRREGULAR MENSES: Primary | ICD-10-CM

## 2020-11-11 DIAGNOSIS — R10.2 PELVIC PAIN: ICD-10-CM

## 2020-11-11 DIAGNOSIS — Z12.4 ENCOUNTER FOR PAPANICOLAOU SMEAR FOR CERVICAL CANCER SCREENING: ICD-10-CM

## 2020-11-11 PROCEDURE — 87591 N.GONORRHOEAE DNA AMP PROB: CPT | Performed by: NURSE PRACTITIONER

## 2020-11-11 PROCEDURE — 87491 CHLMYD TRACH DNA AMP PROBE: CPT | Performed by: NURSE PRACTITIONER

## 2020-11-11 PROCEDURE — G0145 SCR C/V CYTO,THINLAYER,RESCR: HCPCS | Performed by: NURSE PRACTITIONER

## 2020-11-11 PROCEDURE — 87480 CANDIDA DNA DIR PROBE: CPT | Performed by: NURSE PRACTITIONER

## 2020-11-11 PROCEDURE — 99213 OFFICE O/P EST LOW 20 MIN: CPT | Performed by: NURSE PRACTITIONER

## 2020-11-11 PROCEDURE — 1036F TOBACCO NON-USER: CPT | Performed by: NURSE PRACTITIONER

## 2020-11-11 PROCEDURE — 87510 GARDNER VAG DNA DIR PROBE: CPT | Performed by: NURSE PRACTITIONER

## 2020-11-11 PROCEDURE — 87660 TRICHOMONAS VAGIN DIR PROBE: CPT | Performed by: NURSE PRACTITIONER

## 2020-11-13 PROBLEM — N93.0 POSTCOITAL BLEEDING: Status: ACTIVE | Noted: 2020-11-13

## 2020-11-13 PROBLEM — R10.2 PELVIC PAIN: Status: ACTIVE | Noted: 2020-11-13

## 2020-11-13 LAB
C TRACH DNA SPEC QL NAA+PROBE: NEGATIVE
CANDIDA RRNA VAG QL PROBE: NEGATIVE
G VAGINALIS RRNA GENITAL QL PROBE: NEGATIVE
N GONORRHOEA DNA SPEC QL NAA+PROBE: NEGATIVE
T VAGINALIS RRNA GENITAL QL PROBE: NEGATIVE

## 2020-11-16 LAB
LAB AP GYN PRIMARY INTERPRETATION: NORMAL
Lab: NORMAL

## 2020-11-19 ENCOUNTER — APPOINTMENT (OUTPATIENT)
Dept: LAB | Facility: MEDICAL CENTER | Age: 55
End: 2020-11-19
Payer: COMMERCIAL

## 2020-11-19 ENCOUNTER — HOSPITAL ENCOUNTER (OUTPATIENT)
Dept: RADIOLOGY | Facility: MEDICAL CENTER | Age: 55
Discharge: HOME | End: 2020-11-19
Payer: COMMERCIAL

## 2020-11-19 DIAGNOSIS — R10.2 PELVIC PAIN: ICD-10-CM

## 2020-11-19 DIAGNOSIS — N93.0 POSTCOITAL BLEEDING: ICD-10-CM

## 2020-11-19 DIAGNOSIS — Z11.3 SCREENING FOR STD (SEXUALLY TRANSMITTED DISEASE): ICD-10-CM

## 2020-11-19 PROCEDURE — 87389 HIV-1 AG W/HIV-1&-2 AB AG IA: CPT

## 2020-11-19 PROCEDURE — 86803 HEPATITIS C AB TEST: CPT

## 2020-11-19 PROCEDURE — 76830 TRANSVAGINAL US NON-OB: CPT

## 2020-11-19 PROCEDURE — 36415 COLL VENOUS BLD VENIPUNCTURE: CPT

## 2020-11-19 PROCEDURE — 86592 SYPHILIS TEST NON-TREP QUAL: CPT

## 2020-11-19 PROCEDURE — 76856 US EXAM PELVIC COMPLETE: CPT

## 2020-11-20 LAB
HCV AB SER QL: NORMAL
HIV 1+2 AB+HIV1 P24 AG SERPL QL IA: NORMAL
RPR SER QL: NORMAL

## 2020-11-23 ENCOUNTER — TELEPHONE (OUTPATIENT)
Dept: OBGYN CLINIC | Facility: CLINIC | Age: 55
End: 2020-11-23

## 2021-01-20 ENCOUNTER — ANNUAL EXAM (OUTPATIENT)
Dept: OBGYN CLINIC | Facility: MEDICAL CENTER | Age: 56
End: 2021-01-20
Payer: COMMERCIAL

## 2021-01-20 ENCOUNTER — APPOINTMENT (OUTPATIENT)
Dept: LAB | Facility: MEDICAL CENTER | Age: 56
End: 2021-01-20
Payer: COMMERCIAL

## 2021-01-20 VITALS
WEIGHT: 130 LBS | HEIGHT: 61 IN | SYSTOLIC BLOOD PRESSURE: 120 MMHG | DIASTOLIC BLOOD PRESSURE: 82 MMHG | BODY MASS INDEX: 24.55 KG/M2

## 2021-01-20 DIAGNOSIS — Z12.31 ENCOUNTER FOR SCREENING MAMMOGRAM FOR MALIGNANT NEOPLASM OF BREAST: ICD-10-CM

## 2021-01-20 DIAGNOSIS — Z01.419 ENCOUNTER FOR GYNECOLOGICAL EXAMINATION (GENERAL) (ROUTINE) WITHOUT ABNORMAL FINDINGS: Primary | ICD-10-CM

## 2021-01-20 DIAGNOSIS — N93.0 POSTCOITAL BLEEDING: ICD-10-CM

## 2021-01-20 PROBLEM — R10.2 PELVIC PAIN: Status: RESOLVED | Noted: 2020-11-13 | Resolved: 2021-01-20

## 2021-01-20 PROBLEM — N92.6 IRREGULAR MENSES: Status: RESOLVED | Noted: 2020-01-15 | Resolved: 2021-01-20

## 2021-01-20 LAB — TSH SERPL DL<=0.05 MIU/L-ACNC: 1.48 UIU/ML (ref 0.36–3.74)

## 2021-01-20 PROCEDURE — 84443 ASSAY THYROID STIM HORMONE: CPT

## 2021-01-20 PROCEDURE — 36415 COLL VENOUS BLD VENIPUNCTURE: CPT

## 2021-01-20 PROCEDURE — S0612 ANNUAL GYNECOLOGICAL EXAMINA: HCPCS | Performed by: NURSE PRACTITIONER

## 2021-01-20 NOTE — ASSESSMENT & PLAN NOTE
Bleeding is now exclusively post-coital  Exam today is normal  Work up thus far has shown euthyroid status, normal cyto in 21 and neg HPV in 2019, neg STI panel and US with endo stripe 2mm and multiple small uterine fibroids  Reviewed diff dx incl polyp  Recommended repeating pelvic US, checking TSH, RTO for EMB  She agrees with plan

## 2021-01-20 NOTE — PROGRESS NOTES
Assessment/Plan:    Postcoital bleeding  Bleeding is now exclusively post-coital  Exam today is normal  Work up thus far has shown euthyroid status, normal cyto in 20 and neg HPV in 2019, neg STI panel and US with endo stripe 2mm and multiple small uterine fibroids  Reviewed diff dx incl polyp  Recommended repeating pelvic US, checking TSH, RTO for EMB  She agrees with plan  Encounter for gynecological examination (general) (routine) without abnormal findings  Benign findings on routine gyn exam  Recommended monthly SBE, annual CBE and annual screening mammo  ASCCP guidelines reviewed and pap with cotesting noted to be up to date  Colonoscopy is up to date per pt (normal at 49, repeat q10)  The patient denies STI risk factors and declines testing at this time  Reviewed diet/activity recommendations:  Encouraged daily Ca++ and vitamin D intake as well as daily weight bearing exercise for promotion of bone health    Discussed corky/postmenopausal considerations and symptoms to report  RTO in one year for routine annual gyn exam or sooner PRN  Diagnoses and all orders for this visit:    Encounter for gynecological examination (general) (routine) without abnormal findings    Encounter for screening mammogram for malignant neoplasm of breast  -     Mammo screening bilateral w 3d & cad; Future    Postcoital bleeding  -     US pelvis complete w transvaginal; Future  -     TSH, 3rd generation with Free T4 reflex; Future    Other orders  -     Probiotic Product (PROBIOTIC DAILY PO); Take by mouth          Subjective:      Patient ID: Andrae Teixeira is a 48 y o  female  This patient presents for routine annual gyn exam    Medically stable  She has been seen recently with c/o irreg menses, pelvic discomfort  Normal labs and imaging  Now she reports bleeding is occurring exclusively after intercourse  This is bright red, scant to mod amt, sometimes looks like a period     LMP 9/28/20  Mild VM sx - tolerable   She denies acute gyn complaints otherwise  She denies pelvic pain, breast concerns, abnormal discharge, bowel/bladder dysfunction, depression/anxiety  Monogamous  She denies STI concerns  BTL for contraception  The following portions of the patient's history were reviewed and updated as appropriate: allergies, current medications, past family history, past medical history, past social history, past surgical history and problem list     Review of Systems   Constitutional: Negative  Respiratory: Negative  Cardiovascular: Negative  Gastrointestinal: Negative  Genitourinary: Negative  Musculoskeletal: Negative  Skin: Negative  Neurological: Negative  Psychiatric/Behavioral: Negative  Objective:      /82 (BP Location: Right arm, Patient Position: Sitting, Cuff Size: Standard)   Ht 5' 1" (1 549 m)   Wt 59 kg (130 lb)   LMP 02/01/2020 (Within Weeks)   BMI 24 56 kg/m²          Physical Exam  Constitutional:       Appearance: She is well-developed  HENT:      Head: Normocephalic and atraumatic  Eyes:      Pupils: Pupils are equal, round, and reactive to light  Neck:      Musculoskeletal: Normal range of motion and neck supple  Thyroid: No thyromegaly  Cardiovascular:      Rate and Rhythm: Normal rate and regular rhythm  Heart sounds: Normal heart sounds  Pulmonary:      Effort: Pulmonary effort is normal  No respiratory distress  Breath sounds: Normal breath sounds  No wheezing or rales  Chest:      Chest wall: No mass, deformity or tenderness  Breasts: Breasts are symmetrical          Right: No inverted nipple, mass, nipple discharge, skin change or tenderness  Left: No inverted nipple, mass, nipple discharge, skin change or tenderness  Abdominal:      General: There is no distension  Palpations: Abdomen is soft  There is no hepatomegaly, splenomegaly or mass  Tenderness: There is no abdominal tenderness   There is no guarding or rebound  Genitourinary:     Labia:         Right: No rash, tenderness, lesion or injury  Left: No rash, tenderness, lesion or injury  Vagina: Normal  No foreign body  No vaginal discharge, erythema, tenderness or bleeding  Cervix: No cervical motion tenderness, discharge or friability  Uterus: Normal        Adnexa:         Right: No mass, tenderness or fullness  Left: No mass, tenderness or fullness  Rectum: Normal    Musculoskeletal: Normal range of motion  Lymphadenopathy:      Cervical: No cervical adenopathy  Skin:     General: Skin is warm and dry  Findings: No rash  Nails: There is no clubbing  Neurological:      Mental Status: She is alert and oriented to person, place, and time  Cranial Nerves: No cranial nerve deficit  Psychiatric:         Speech: Speech normal          Behavior: Behavior normal          Thought Content:  Thought content normal          Judgment: Judgment normal

## 2021-01-20 NOTE — ASSESSMENT & PLAN NOTE
Benign findings on routine gyn exam  Recommended monthly SBE, annual CBE and annual screening mammo  ASCCP guidelines reviewed and pap with cotesting noted to be up to date  Colonoscopy is up to date per pt (normal at 49, repeat q10)  The patient denies STI risk factors and declines testing at this time  Reviewed diet/activity recommendations:  Encouraged daily Ca++ and vitamin D intake as well as daily weight bearing exercise for promotion of bone health    Discussed corky/postmenopausal considerations and symptoms to report  RTO in one year for routine annual gyn exam or sooner PRN

## 2021-01-29 ENCOUNTER — HOSPITAL ENCOUNTER (OUTPATIENT)
Dept: RADIOLOGY | Facility: MEDICAL CENTER | Age: 56
Discharge: HOME | End: 2021-01-29
Payer: COMMERCIAL

## 2021-01-29 DIAGNOSIS — N93.0 POSTCOITAL BLEEDING: ICD-10-CM

## 2021-01-29 PROCEDURE — 76856 US EXAM PELVIC COMPLETE: CPT

## 2021-01-29 PROCEDURE — 76830 TRANSVAGINAL US NON-OB: CPT

## 2021-02-24 ENCOUNTER — PROCEDURE VISIT (OUTPATIENT)
Dept: OBGYN CLINIC | Facility: MEDICAL CENTER | Age: 56
End: 2021-02-24
Payer: COMMERCIAL

## 2021-02-24 VITALS — SYSTOLIC BLOOD PRESSURE: 110 MMHG | BODY MASS INDEX: 24.56 KG/M2 | DIASTOLIC BLOOD PRESSURE: 72 MMHG | WEIGHT: 130 LBS

## 2021-02-24 DIAGNOSIS — N93.0 POSTCOITAL BLEEDING: Primary | ICD-10-CM

## 2021-02-24 PROCEDURE — 88305 TISSUE EXAM BY PATHOLOGIST: CPT | Performed by: PATHOLOGY

## 2021-02-24 PROCEDURE — 58100 BIOPSY OF UTERUS LINING: CPT | Performed by: NURSE PRACTITIONER

## 2021-02-24 NOTE — PROGRESS NOTES
Endometrial biopsy    Date/Time: 2/24/2021 12:51 PM  Performed by: FRANK Coffey  Authorized by: FRANK Coffey   Universal Protocol:  Consent: Verbal consent obtained  Written consent obtained  Risks and benefits: risks, benefits and alternatives were discussed  Consent given by: patient  Time out: Immediately prior to procedure a "time out" was called to verify the correct patient, procedure, equipment, support staff and site/side marked as required  Patient understanding: patient states understanding of the procedure being performed  Patient consent: the patient's understanding of the procedure matches consent given  Procedure consent: procedure consent matches procedure scheduled  Patient identity confirmed: verbally with patient      Indication:     Indications: Post-coital bleeding      Chronicity of post-coital bleeding:  New    Progression of post-coital bleeding:  Resolved  Procedure:     Procedure: endometrial biopsy with Pipelle      A bivalve speculum was placed in the vagina: yes      Cervix cleaned and prepped: yes      The cervix was dilated: no      Uterus sounded: yes      Uterus sound depth (cm):  7 5    Specimen collected: specimen collected and sent to pathology      Patient tolerated procedure well with no complications: yes    Findings:     Uterus size:  Non-gravid    Cervix: normal      Adnexa: normal    Comments:     Procedure comments: The patient presents for EMB collection for c/o PC bleeding; this is now resolved and she reports last bleeding was 2d ago, scant x1 day, spont and not after intercourse  Reviewed risks/benefits of this procedure and consent was obtained  Allergies confirmed and time out performed  The patient was positioned in lithotomy and spec was inserted  Cervix was visualized and cleansed with betadine x3  Pipelle was passed easily through the cervix and a scant amt of tissue was obtained in two sweeps  The patient tolerated well  Hemostasis noted   All instruments removed  Tissue sent to path  Reviewed reasons to call

## 2021-02-24 NOTE — ASSESSMENT & PLAN NOTE
EMB collected today  Tolerated well  Reviewed post-procedure considerations and reasons to call  Will advise as indicated with results  Advised may be inconclusive given scant specimen  Reviewed indications for hysteroscopy  Again reviewed concepts of perimenopause and common sx  All questions were answered to the patient's satisfaction    We will f/u as indicated

## 2021-03-02 ENCOUNTER — TELEPHONE (OUTPATIENT)
Dept: OBGYN CLINIC | Facility: CLINIC | Age: 56
End: 2021-03-02

## 2021-03-02 NOTE — TELEPHONE ENCOUNTER
Called and discussed normal biopsy results with [facility]  She is asking if there is any intervention whether it be surgical or medication wise to stop her post coital bleeding?

## 2021-03-02 NOTE — TELEPHONE ENCOUNTER
----- Message from Judy Guzman sent at 3/1/2021  5:02 PM EST -----  Please notify patient that EMB is benign  This was ordered for irreg menses/PC bleeding  Please notify patient and advise that benign hormonal imbalance 2ndary to perimenopause is suspected   Continue to observe and f/u for sx of DUB

## 2021-07-12 ENCOUNTER — OFFICE VISIT (OUTPATIENT)
Dept: FAMILY MEDICINE CLINIC | Facility: MEDICAL CENTER | Age: 56
End: 2021-07-12
Payer: COMMERCIAL

## 2021-07-12 VITALS
HEART RATE: 76 BPM | BODY MASS INDEX: 24.92 KG/M2 | HEIGHT: 61 IN | DIASTOLIC BLOOD PRESSURE: 82 MMHG | SYSTOLIC BLOOD PRESSURE: 140 MMHG | WEIGHT: 132 LBS | TEMPERATURE: 97.2 F

## 2021-07-12 DIAGNOSIS — Z13.220 SCREENING FOR LIPID DISORDERS: ICD-10-CM

## 2021-07-12 DIAGNOSIS — Z13.1 SCREENING FOR DIABETES MELLITUS: ICD-10-CM

## 2021-07-12 DIAGNOSIS — Z13.29 SCREENING FOR THYROID DISORDER: ICD-10-CM

## 2021-07-12 DIAGNOSIS — R03.0 ELEVATED BP WITHOUT DIAGNOSIS OF HYPERTENSION: ICD-10-CM

## 2021-07-12 DIAGNOSIS — Z00.00 PHYSICAL EXAM, ANNUAL: ICD-10-CM

## 2021-07-12 DIAGNOSIS — R42 DIZZINESS: Primary | ICD-10-CM

## 2021-07-12 PROBLEM — R73.09 ELEVATED HEMOGLOBIN A1C: Status: RESOLVED | Noted: 2019-01-03 | Resolved: 2021-07-12

## 2021-07-12 PROCEDURE — 3008F BODY MASS INDEX DOCD: CPT | Performed by: FAMILY MEDICINE

## 2021-07-12 PROCEDURE — 3725F SCREEN DEPRESSION PERFORMED: CPT | Performed by: FAMILY MEDICINE

## 2021-07-12 PROCEDURE — 99396 PREV VISIT EST AGE 40-64: CPT | Performed by: FAMILY MEDICINE

## 2021-07-12 PROCEDURE — 99213 OFFICE O/P EST LOW 20 MIN: CPT | Performed by: FAMILY MEDICINE

## 2021-07-12 PROCEDURE — 1036F TOBACCO NON-USER: CPT | Performed by: FAMILY MEDICINE

## 2021-07-12 NOTE — PROGRESS NOTES
Assessment/Plan:       Diagnoses and all orders for this visit:    Physical exam, annual  64-year-old female presenting for a physical exam     Continue regular follow-up with Gynecology  Patient encouraged to schedule her mammogram as it was previously ordered by her gynecologist     Colonoscopy up-to-date  Screening for lipid disorders  -     Lipid panel; Future  -     LDL cholesterol, direct; Future  Screening for diabetes mellitus  -     Comprehensive metabolic panel; Future  -     Hemoglobin A1C; Future  Screening for thyroid disorder  -     TSH, 3rd generation; Future  -     T4, free; Future  Check screening labs  COVID vaccine recommended but patient declined  Follow-up in six months or sooner if needed  Subjective:      Patient ID: Victoriano Wilkins is a 48 y o  female  Patient presents for a physical exam     She eats healthy and exercises regularly  No tobacco, alcohol or drug use  Does have a gynecologist     Does have a mammogram ordered  Has had her colonoscopy  Does not want the COVID vaccine  Agreeable to labs  The following portions of the patient's history were reviewed and updated as appropriate: She  has a past medical history of Anemia, ASCUS with positive high risk HPV cervical (2015), Asthma, Headache, History of colonic polyps, HPV (human papilloma virus) infection, Hyperlipidemia, Kidney stone, and Uterine fibroid  She   Patient Active Problem List    Diagnosis Date Noted    Elevated BP without diagnosis of hypertension 07/12/2021    Postcoital bleeding 01/20/2021    Vaginal discharge 12/31/2019    Fibroid 01/02/2019    Encounter for gynecological examination (general) (routine) without abnormal findings 01/02/2019    Dry eye syndrome 10/25/2017    Headache 10/25/2017    Elevated cholesterol 10/25/2017    Right shoulder pain 10/25/2017     She  has a past surgical history that includes Colposcopy;  Endometrial biopsy (04/28/2015); Tubal ligation; Lithotripsy; pr colonoscopy flx dx w/collj spec when pfrmd (N/A, 12/5/2017); Colposcopy w/ biopsy / curettage (04/28/2015); Breast surgery; Breast biopsy (Left); Colonoscopy; Dilation and curettage of uterus; Bone biopsy (Left, 6/4/2018); and Foot surgery  Her family history includes Arthritis in her mother; Diabetes in her father; Hypertension in her brother, maternal grandfather, maternal grandmother, and mother; No Known Problems in her brother, brother, brother, paternal grandfather, paternal grandmother, son, and son; Prostate cancer in her maternal uncle  She  reports that she has never smoked  She has never used smokeless tobacco  She reports that she does not drink alcohol and does not use drugs  Current Outpatient Medications   Medication Sig Dispense Refill    cycloSPORINE (RESTASIS) 0 05 % ophthalmic emulsion 1 drop 2 (two) times a day      multivitamin (THERAGRAN) TABS Take 1 tablet by mouth daily      Probiotic Product (PROBIOTIC DAILY PO) Take by mouth      TURMERIC PO Take by mouth       No current facility-administered medications for this visit  Current Outpatient Medications on File Prior to Visit   Medication Sig    cycloSPORINE (RESTASIS) 0 05 % ophthalmic emulsion 1 drop 2 (two) times a day    multivitamin (THERAGRAN) TABS Take 1 tablet by mouth daily    Probiotic Product (PROBIOTIC DAILY PO) Take by mouth    TURMERIC PO Take by mouth    [DISCONTINUED] cyclobenzaprine (FLEXERIL) 5 mg tablet Take 1-2 tabs po qhs prn pain     No current facility-administered medications on file prior to visit  She is allergic to penicillins       Review of Systems   Constitutional: Negative for fever  Respiratory: Negative for shortness of breath  Cardiovascular: Negative for chest pain  Gastrointestinal: Negative for abdominal pain and blood in stool  Genitourinary: Negative for dysuria and hematuria  Musculoskeletal: Negative for arthralgias and myalgias     Skin: Negative for rash  Neurological: Negative for headaches  Objective:      /82 (BP Location: Left arm, Patient Position: Sitting, Cuff Size: Large)   Pulse 76   Temp (!) 97 2 °F (36 2 °C)   Ht 5' 1" (1 549 m)   Wt 59 9 kg (132 lb)   BMI 24 94 kg/m²          Physical Exam  Constitutional:       General: She is not in acute distress  Appearance: She is not ill-appearing  Comments: No nose, mouth or throat complaints  Nose, mouth and throat not examined  Mask in place  COVID-19 pandemic  HENT:      Head: Normocephalic and atraumatic  Right Ear: Tympanic membrane, ear canal and external ear normal       Left Ear: Tympanic membrane, ear canal and external ear normal    Eyes:      General: Lids are normal       Conjunctiva/sclera: Conjunctivae normal       Pupils: Pupils are equal, round, and reactive to light  Neck:      Trachea: Trachea normal    Cardiovascular:      Rate and Rhythm: Normal rate and regular rhythm  Heart sounds: S1 normal and S2 normal  No murmur heard  Pulmonary:      Effort: Pulmonary effort is normal       Breath sounds: Normal breath sounds  Abdominal:      General: Bowel sounds are normal       Palpations: Abdomen is soft  Tenderness: There is no abdominal tenderness  Musculoskeletal:         General: Normal range of motion  Skin:     General: Skin is warm and dry  Neurological:      Mental Status: She is alert and oriented to person, place, and time  Comments: Modified Hallpike maneuver did reproduce symptoms  Psychiatric:         Speech: Speech normal          Behavior: Behavior normal          Thought Content:  Thought content normal

## 2021-07-12 NOTE — PROGRESS NOTES
Assessment/Plan:       Diagnoses and all orders for this visit:    Dizziness  Likely secondary to vestibular dysfunction  Physical therapy recommended but patient states she will see her chiropractor for treatment  Elevated BP without diagnosis of hypertension  Blood pressure elevated in the office today  No intervention at this time  Patient encouraged to eat healthy and stay physically active to keep blood pressure controlled  I will plan on following up with patient in about six months to monitor blood pressure  Follow-up in six months or sooner if needed  Subjective:      Patient ID: Amparo Lopez is a 48 y o  female  Patient presents with a complaint of dizziness  Last episode was Friday  She describes her symptoms as a room spinning sensation  Has happened in the past and has seen her chiropractor who performed maneuvers with her head and symptoms resolved  The following portions of the patient's history were reviewed and updated as appropriate: She  has a past medical history of Anemia, ASCUS with positive high risk HPV cervical (2015), Asthma, Headache, History of colonic polyps, HPV (human papilloma virus) infection, Hyperlipidemia, Kidney stone, and Uterine fibroid  She   Patient Active Problem List    Diagnosis Date Noted    Elevated BP without diagnosis of hypertension 07/12/2021    Postcoital bleeding 01/20/2021    Vaginal discharge 12/31/2019    Fibroid 01/02/2019    Encounter for gynecological examination (general) (routine) without abnormal findings 01/02/2019    Dry eye syndrome 10/25/2017    Headache 10/25/2017    Elevated cholesterol 10/25/2017    Right shoulder pain 10/25/2017     She  has a past surgical history that includes Colposcopy; Endometrial biopsy (04/28/2015); Tubal ligation; Lithotripsy; pr colonoscopy flx dx w/collj spec when pfrmd (N/A, 12/5/2017); Colposcopy w/ biopsy / curettage (04/28/2015);  Breast surgery; Breast biopsy (Left); Colonoscopy; Dilation and curettage of uterus; Bone biopsy (Left, 6/4/2018); and Foot surgery  Her family history includes Arthritis in her mother; Diabetes in her father; Hypertension in her brother, maternal grandfather, maternal grandmother, and mother; No Known Problems in her brother, brother, brother, paternal grandfather, paternal grandmother, son, and son; Prostate cancer in her maternal uncle  She  reports that she has never smoked  She has never used smokeless tobacco  She reports that she does not drink alcohol and does not use drugs  Current Outpatient Medications   Medication Sig Dispense Refill    cycloSPORINE (RESTASIS) 0 05 % ophthalmic emulsion 1 drop 2 (two) times a day      multivitamin (THERAGRAN) TABS Take 1 tablet by mouth daily      Probiotic Product (PROBIOTIC DAILY PO) Take by mouth      TURMERIC PO Take by mouth       No current facility-administered medications for this visit  Current Outpatient Medications on File Prior to Visit   Medication Sig    cycloSPORINE (RESTASIS) 0 05 % ophthalmic emulsion 1 drop 2 (two) times a day    multivitamin (THERAGRAN) TABS Take 1 tablet by mouth daily    Probiotic Product (PROBIOTIC DAILY PO) Take by mouth    TURMERIC PO Take by mouth    [DISCONTINUED] cyclobenzaprine (FLEXERIL) 5 mg tablet Take 1-2 tabs po qhs prn pain     No current facility-administered medications on file prior to visit  She is allergic to penicillins       Review of Systems   Constitutional: Negative for fever  Respiratory: Negative for shortness of breath  Cardiovascular: Negative for chest pain  Gastrointestinal: Negative for abdominal pain and blood in stool  Genitourinary: Negative for dysuria and hematuria  Musculoskeletal: Negative for arthralgias and myalgias  Skin: Negative for rash  Neurological: Negative for headaches           Objective:      /82 (BP Location: Left arm, Patient Position: Sitting, Cuff Size: Large)   Pulse 76   Temp Berenice Campa ) 97 2 °F (36 2 °C)   Ht 5' 1" (1 549 m)   Wt 59 9 kg (132 lb)   BMI 24 94 kg/m²          Physical Exam  Constitutional:       General: She is not in acute distress  Appearance: She is not ill-appearing  Comments: No nose, mouth or throat complaints  Nose, mouth and throat not examined  Mask in place  COVID-19 pandemic  HENT:      Head: Normocephalic and atraumatic  Right Ear: Tympanic membrane, ear canal and external ear normal       Left Ear: Tympanic membrane, ear canal and external ear normal    Eyes:      General: Lids are normal       Conjunctiva/sclera: Conjunctivae normal       Pupils: Pupils are equal, round, and reactive to light  Neck:      Trachea: Trachea normal    Cardiovascular:      Rate and Rhythm: Normal rate and regular rhythm  Heart sounds: S1 normal and S2 normal  No murmur heard  Pulmonary:      Effort: Pulmonary effort is normal       Breath sounds: Normal breath sounds  Abdominal:      General: Bowel sounds are normal       Palpations: Abdomen is soft  Tenderness: There is no abdominal tenderness  Musculoskeletal:         General: Normal range of motion  Skin:     General: Skin is warm and dry  Neurological:      Mental Status: She is alert and oriented to person, place, and time  Comments: Modified Hallpike maneuver did reproduce symptoms  Psychiatric:         Speech: Speech normal          Behavior: Behavior normal          Thought Content:  Thought content normal

## 2021-07-13 ENCOUNTER — APPOINTMENT (OUTPATIENT)
Dept: LAB | Facility: MEDICAL CENTER | Age: 56
End: 2021-07-13
Payer: COMMERCIAL

## 2021-07-13 DIAGNOSIS — Z13.1 SCREENING FOR DIABETES MELLITUS: ICD-10-CM

## 2021-07-13 DIAGNOSIS — Z13.29 SCREENING FOR THYROID DISORDER: ICD-10-CM

## 2021-07-13 DIAGNOSIS — Z13.220 SCREENING FOR LIPID DISORDERS: ICD-10-CM

## 2021-07-13 LAB
ALBUMIN SERPL BCP-MCNC: 3.8 G/DL (ref 3.5–5)
ALP SERPL-CCNC: 70 U/L (ref 46–116)
ALT SERPL W P-5'-P-CCNC: 28 U/L (ref 12–78)
ANION GAP SERPL CALCULATED.3IONS-SCNC: 3 MMOL/L (ref 4–13)
AST SERPL W P-5'-P-CCNC: 18 U/L (ref 5–45)
BILIRUB SERPL-MCNC: 0.97 MG/DL (ref 0.2–1)
BUN SERPL-MCNC: 22 MG/DL (ref 5–25)
CALCIUM SERPL-MCNC: 9.8 MG/DL (ref 8.3–10.1)
CHLORIDE SERPL-SCNC: 107 MMOL/L (ref 100–108)
CHOLEST SERPL-MCNC: 244 MG/DL (ref 50–200)
CO2 SERPL-SCNC: 28 MMOL/L (ref 21–32)
CREAT SERPL-MCNC: 0.87 MG/DL (ref 0.6–1.3)
EST. AVERAGE GLUCOSE BLD GHB EST-MCNC: 108 MG/DL
GFR SERPL CREATININE-BSD FRML MDRD: 76 ML/MIN/1.73SQ M
GLUCOSE P FAST SERPL-MCNC: 89 MG/DL (ref 65–99)
HBA1C MFR BLD: 5.4 %
HDLC SERPL-MCNC: 51 MG/DL
LDLC SERPL CALC-MCNC: 160 MG/DL (ref 0–100)
LDLC SERPL DIRECT ASSAY-MCNC: 150 MG/DL (ref 0–100)
NONHDLC SERPL-MCNC: 193 MG/DL
POTASSIUM SERPL-SCNC: 4.4 MMOL/L (ref 3.5–5.3)
PROT SERPL-MCNC: 7.8 G/DL (ref 6.4–8.2)
SODIUM SERPL-SCNC: 138 MMOL/L (ref 136–145)
T4 FREE SERPL-MCNC: 0.99 NG/DL (ref 0.76–1.46)
TRIGL SERPL-MCNC: 167 MG/DL
TSH SERPL DL<=0.05 MIU/L-ACNC: 1.43 UIU/ML (ref 0.36–3.74)

## 2021-07-13 PROCEDURE — 83721 ASSAY OF BLOOD LIPOPROTEIN: CPT

## 2021-07-13 PROCEDURE — 84439 ASSAY OF FREE THYROXINE: CPT

## 2021-07-13 PROCEDURE — 83036 HEMOGLOBIN GLYCOSYLATED A1C: CPT

## 2021-07-13 PROCEDURE — 36415 COLL VENOUS BLD VENIPUNCTURE: CPT

## 2021-07-13 PROCEDURE — 84443 ASSAY THYROID STIM HORMONE: CPT

## 2021-07-13 PROCEDURE — 80053 COMPREHEN METABOLIC PANEL: CPT

## 2021-07-13 PROCEDURE — 80061 LIPID PANEL: CPT

## 2021-07-15 ENCOUNTER — TELEPHONE (OUTPATIENT)
Dept: FAMILY MEDICINE CLINIC | Facility: MEDICAL CENTER | Age: 56
End: 2021-07-15

## 2021-07-15 NOTE — TELEPHONE ENCOUNTER
Pt left a voice message stating that she saw the results of her b/w and her cholesterol  She wants to know if she should continue with diet and exercise or if you want her to go on meds  Please, advise  Thank you

## 2022-01-18 ENCOUNTER — TELEPHONE (OUTPATIENT)
Dept: OBGYN CLINIC | Facility: CLINIC | Age: 57
End: 2022-01-18

## 2022-01-18 NOTE — TELEPHONE ENCOUNTER
Pt says she just wants to ask someone if it is normal for a 56yr old women to still have her menstrual  Please advise

## 2022-02-08 ENCOUNTER — ANNUAL EXAM (OUTPATIENT)
Dept: OBGYN CLINIC | Facility: MEDICAL CENTER | Age: 57
End: 2022-02-08
Payer: COMMERCIAL

## 2022-02-08 VITALS — DIASTOLIC BLOOD PRESSURE: 82 MMHG | WEIGHT: 135.4 LBS | SYSTOLIC BLOOD PRESSURE: 118 MMHG | BODY MASS INDEX: 25.58 KG/M2

## 2022-02-08 DIAGNOSIS — N93.0 POSTCOITAL BLEEDING: ICD-10-CM

## 2022-02-08 DIAGNOSIS — Z11.51 SCREENING FOR HPV (HUMAN PAPILLOMAVIRUS): ICD-10-CM

## 2022-02-08 DIAGNOSIS — Z12.31 ENCOUNTER FOR SCREENING MAMMOGRAM FOR MALIGNANT NEOPLASM OF BREAST: Primary | ICD-10-CM

## 2022-02-08 DIAGNOSIS — Z01.419 ENCOUNTER FOR GYNECOLOGICAL EXAMINATION (GENERAL) (ROUTINE) WITHOUT ABNORMAL FINDINGS: ICD-10-CM

## 2022-02-08 PROCEDURE — S0612 ANNUAL GYNECOLOGICAL EXAMINA: HCPCS | Performed by: NURSE PRACTITIONER

## 2022-02-08 NOTE — ASSESSMENT & PLAN NOTE
Benign findings on routine gyn exam  Recommended monthly SBE, annual CBE and annual screening mammo  ASCCP guidelines reviewed and pap with cotesting noted to be up to date; this low risk patient was advised she meets criteria to d/c pap screening at age 73  Colonoscopy noted to be up to date-due 12/22  The patient denies STI risk factors and declines testing at this time  Reviewed diet/activity recommendations:  Encouraged daily Ca++ and vitamin D intake as well as daily weight bearing exercise for promotion of bone health    Discussed perimenopausal considerations and symptoms to report  RTO in one year for routine annual gyn exam or sooner PRN

## 2022-02-08 NOTE — ASSESSMENT & PLAN NOTE
Normal pelvic exam   Continues to have sometimes  Had negative workup 2/21 for same (U/S, pap and EMB)  Advised lubricant with IC

## 2022-02-08 NOTE — PROGRESS NOTES
Encounter for gynecological examination (general) (routine) without abnormal findings  Benign findings on routine gyn exam  Recommended monthly SBE, annual CBE and annual screening mammo  ASCCP guidelines reviewed and pap with cotesting noted to be up to date; this low risk patient was advised she meets criteria to d/c pap screening at age 73  Colonoscopy noted to be up to date-due 12/22  The patient denies STI risk factors and declines testing at this time  Reviewed diet/activity recommendations:  Encouraged daily Ca++ and vitamin D intake as well as daily weight bearing exercise for promotion of bone health    Discussed perimenopausal considerations and symptoms to report  RTO in one year for routine annual gyn exam or sooner PRN  Postcoital bleeding  Normal pelvic exam   Continues to have sometimes  Had negative workup 2/21 for same (U/S, pap and EMB)  Advised lubricant with IC     AGATA recommends colposcopy  LM (ok per communication form) that we can do test  To cb if interested  Diagnoses and all orders for this visit:    Encounter for screening mammogram for malignant neoplasm of breast  -     Mammo screening bilateral w 3d & cad; Future    Encounter for gynecological examination (general) (routine) without abnormal findings  -     Liquid-based pap, screening    Screening for HPV (human papillomavirus)  -     Liquid-based pap, screening    Postcoital bleeding         Delbert Sabillon   1965    CC:  Yearly exam    S:  Delbert Sabillon is a 55 y o  female here for yearly exam      She is perimenopausal -she had heavy cycle in January but before that last cycle was 5/21  She will call if has another heavy cycle  She still is having PCB sometimes  Had negative workup 2/21  Advised to try lubricant  She denies abnormal vaginal discharge, itching, odor or dryness  She denies breast concerns, abdominal/pelvic pain or bladder/bowel dysfunction  Denies stress incontinence and hot flashes        Sexual activity: She is sexually active without pain, bleeding or dryness  STD testing: She does not want STD testing today  Last Pap: 11/20 neg pap and 11/19 neg pap and HPV  Last Mammo: 6/20 ANDREW 2  SBE: yes  Last Colonoscopy: 2017 -polyps due 12/22    We reviewed [facility] guidelines for Pap testing       Family hx of breast cancer: denies  Family hx of ovarian cancer: denies  Family hx of colon cancer: denies      Current Outpatient Medications:     cycloSPORINE (RESTASIS) 0 05 % ophthalmic emulsion, 1 drop 2 (two) times a day, Disp: , Rfl:     multivitamin (THERAGRAN) TABS, Take 1 tablet by mouth daily, Disp: , Rfl:     Probiotic Product (PROBIOTIC DAILY PO), Take by mouth, Disp: , Rfl:     TURMERIC PO, Take by mouth, Disp: , Rfl:   Social History     Socioeconomic History    Marital status: Single     Spouse name: Not on file    Number of children: 2    Years of education: Not on file    Highest education level: Not on file   Occupational History    Not on file   Tobacco Use    Smoking status: Never Smoker    Smokeless tobacco: Never Used   Vaping Use    Vaping Use: Never used   Substance and Sexual Activity    Alcohol use: No    Drug use: No    Sexual activity: Yes     Partners: Male     Birth control/protection: Female Sterilization   Other Topics Concern    Not on file   Social History Narrative    Drinks coffee    Exercise habits     Social Determinants of Health     Financial Resource Strain: Not on file   Food Insecurity: Not on file   Transportation Needs: Not on file   Physical Activity: Not on file   Stress: Not on file   Social Connections: Not on file   Intimate Partner Violence: Not on file   Housing Stability: Not on file     Family History   Problem Relation Age of Onset    Arthritis Mother     Hypertension Mother     Diabetes Father     No Known Problems Brother     No Known Problems Son     Hypertension Maternal Grandmother     Hypertension Maternal Grandfather     No Known Problems Paternal Grandmother     No Known Problems Paternal Grandfather     Hypertension Brother     No Known Problems Brother     No Known Problems Brother     No Known Problems Son     Prostate cancer Maternal Uncle      Past Medical History:   Diagnosis Date    Anemia     iron defficiency    ASCUS with positive high risk HPV cervical 2015    Asthma     last assessed - 25Oct2017    Headache     History of colonic polyps     Resolved - 2017    HPV (human papilloma virus) infection     Hyperlipidemia     diet controlled    Kidney stone     Uterine fibroid         Review of Systems   Constitutional: Negative for appetite change, fatigue and unexpected weight change  Respiratory: Negative for shortness of breath  Cardiovascular: Negative for chest pain and leg swelling  Gastrointestinal: Negative for abdominal pain  Endocrine: Negative for cold intolerance and heat intolerance  Breasts:  Negative for breast tenderness or masses  Genitourinary: Negative for dyspareunia, dysuria, flank pain, frequency, genital sores, hematuria and pelvic pain  + for irregular cycles and PCB  Negative for stress incontinence  Musculoskeletal: Negative for back pain  Neurological: Negative for headaches  O:  Blood pressure 118/82, weight 61 4 kg (135 lb 6 4 oz), last menstrual period 01/18/2021  Patient appears well and is not in distress  Neck is supple without masses  Normal thyroid  Heart regular rate and rhythm  Lungs CTA bilaterally   Breasts are symmetrical without mass, tenderness, nipple discharge, skin changes or adenopathy  Abdomen is soft and nontender without masses  External genitals are normal without lesions or rashes  Urethral meatus and urethra are normal  Bladder is normal to palpation  Vagina is normal without discharge or bleeding  Mild atrophic changes noted   Cervix is normal without discharge or lesion  Uterus is normal, mobile, nontender without palpable mass    Adnexa are normal, nontender, without palpable mass     Skin warm and dry   Capillary refill < 2 seconds  Alert and oriented x 3 with normal affect

## 2022-02-18 ENCOUNTER — TELEPHONE (OUTPATIENT)
Dept: OBGYN CLINIC | Facility: CLINIC | Age: 57
End: 2022-02-18

## 2022-02-18 NOTE — TELEPHONE ENCOUNTER
Pt contacted -# [phone]-[facility] vm- per hippa communication consent on file- lmom advising pt as directed with call bk number to call and schedule

## 2022-02-18 NOTE — TELEPHONE ENCOUNTER
----- Message from [address] sent at 2/17/2022  4:24 PM EST -----  Please offer her a colpo for her continue PCB as per Paddy Portillo  ----- Message -----  From: Yonatan Nichole MD  Sent: 2/8/2022   9:55 AM EST  To: FRANK Love    Did she have colpo for her postcoital bleeding?  If not should consider to complete evaluation

## 2022-03-08 ENCOUNTER — TELEPHONE (OUTPATIENT)
Dept: FAMILY MEDICINE CLINIC | Facility: MEDICAL CENTER | Age: 57
End: 2022-03-08

## 2022-03-08 NOTE — TELEPHONE ENCOUNTER
Please call patient to set up appt for [place] for covid swab  Patient scheduled a sick appt via My chart  Called patient and triaged  Sinus symptoms and dry cough since Friday   Suggest trying mucinex or sudafed  Aware covid swab is advised first, if negative we can see her   In conversation we were disconnected

## 2022-04-25 ENCOUNTER — TELEPHONE (OUTPATIENT)
Dept: OBGYN CLINIC | Facility: MEDICAL CENTER | Age: 57
End: 2022-04-25

## 2022-11-11 ENCOUNTER — TELEPHONE (OUTPATIENT)
Dept: GASTROENTEROLOGY | Facility: CLINIC | Age: 57
End: 2022-11-11

## 2023-01-18 ENCOUNTER — OFFICE VISIT (OUTPATIENT)
Dept: FAMILY MEDICINE CLINIC | Facility: CLINIC | Age: 58
End: 2023-01-18

## 2023-01-18 VITALS
HEIGHT: 62 IN | TEMPERATURE: 97.5 F | HEART RATE: 67 BPM | OXYGEN SATURATION: 99 % | WEIGHT: 135 LBS | DIASTOLIC BLOOD PRESSURE: 80 MMHG | RESPIRATION RATE: 16 BRPM | SYSTOLIC BLOOD PRESSURE: 130 MMHG | BODY MASS INDEX: 24.84 KG/M2

## 2023-01-18 DIAGNOSIS — Z12.11 SCREENING FOR MALIGNANT NEOPLASM OF COLON: ICD-10-CM

## 2023-01-18 DIAGNOSIS — Z00.00 ROUTINE ADULT HEALTH MAINTENANCE: Primary | ICD-10-CM

## 2023-01-18 DIAGNOSIS — E78.2 MIXED HYPERLIPIDEMIA: ICD-10-CM

## 2023-01-18 DIAGNOSIS — Z12.31 ENCOUNTER FOR SCREENING MAMMOGRAM FOR MALIGNANT NEOPLASM OF BREAST: ICD-10-CM

## 2023-01-18 NOTE — PROGRESS NOTES
Name: Jacobo Enriquez      : 1965      MRN: [id number]  Encounter Provider: FRANK Dhillon  Encounter Date: 2023   Encounter department: [address] [place] [address] Place     1  Routine adult health maintenance  -     Cologuard  -     CBC and differential; Future  -     Comprehensive metabolic panel; Future  -     Lipid panel; Future  -     TSH, 3rd generation; Future  -     UA w Reflex to Microscopic w Reflex to Culture    2  Screening for malignant neoplasm of colon  -     Cologuard    3  Mixed hyperlipidemia  -     CBC and differential; Future  -     Comprehensive metabolic panel; Future  -     Lipid panel; Future  -     TSH, 3rd generation; Future  -     UA w Reflex to Microscopic w Reflex to Culture          physical assessment performed today for a routine health physical to establish care of this practice  A physical assessment was unremarkable  Vital signs are well within normal limits  Patient's BMI is very well controlled  Patient is otherwise relatively healthy routine labs are warranted at last labs were performed in   Patient takes no routine medication did advise that I would like this labs completed I will follow up with her back in the office for review if necessary  Otherwise all questions were answered a mammogram was ordered for her will contact her with the time the date for the appointment  Otherwise all other care gaps are up-to-date she is doing quite well  Subjective       Patient is here for routine follow-up  To review most recent labs  To also discuss current state of health and any new problems that they may be experiencing  Patient states that medications taken as prescribed and very well tolerated no new complaints at this time )    Review of Systems   Constitutional: Negative for appetite change and fever  HENT: Negative for sinus pressure and sore throat  Eyes: Negative for pain     Respiratory: Negative for shortness of breath  Cardiovascular: Negative for chest pain  Gastrointestinal: Negative for abdominal pain  Genitourinary: Negative for dysuria  Musculoskeletal: Negative for arthralgias and myalgias  Skin: Negative for color change  Neurological: Negative for light-headedness  Psychiatric/Behavioral: Negative for behavioral problems  Past Medical History:   Diagnosis Date   • Anemia     iron defficiency   • ASCUS with positive high risk HPV cervical 2015   • Asthma     last assessed - 25Oct2017   • Headache    • History of colonic polyps     Resolved - 2017   • HPV (human papilloma virus) infection    • Hyperlipidemia     diet controlled   • Kidney stone    • Uterine fibroid      Past Surgical History:   Procedure Laterality Date   • BONE BIOPSY Left 6/4/2018    Procedure: FOOT SOFT TISSUE MASS REMOVAL;  Surgeon: Wicho Muñoz DPM;  Location: [facility];  Service: Podiatry   • BREAST BIOPSY Left     Percutaneous needle core; last assessed - [id number]   • BREAST SURGERY     • COLONOSCOPY     • COLPOSCOPY     • COLPOSCOPY W/ BIOPSY / CURETTAGE  04/28/2015    Colposcopy Cervix with Biopsy(s) - COLPO=neg; last assessed - 28Apr2015   • DILATION AND CURETTAGE OF UTERUS     • ENDOMETRIAL BIOPSY  04/28/2015    by suction; EB/COLPO = Negative Results; last assessed - 30Apr2015   • FOOT SURGERY     • LITHOTRIPSY      Renal   • [place] COLONOSCOPY FLX DX W/COLLJ SPEC WHEN PFRMD N/A 12/5/2017    Procedure: COLONOSCOPY;  Surgeon: Vince Denver, MD;  Location: [facility]; Service: Gastroenterology;  Complete Colonoscopy   • TUBAL LIGATION       Family History   Problem Relation Age of Onset   • Arthritis Mother    • Hypertension Mother    • Diabetes Father    • No Known Problems Brother    • No Known Problems Son    • Hypertension Maternal Grandmother    • Hypertension Maternal Grandfather    • No Known Problems Paternal Grandmother    • No Known Problems Paternal Grandfather    • Hypertension Brother    • No Known Problems Brother    • No Known Problems Brother    • No Known Problems Son    • Prostate cancer Maternal Uncle      Social History     Socioeconomic History   • Marital status: Single     Spouse name: None   • Number of children: 2   • Years of education: None   • Highest education level: None   Occupational History   • None   Tobacco Use   • Smoking status: Never   • Smokeless tobacco: Never   Vaping Use   • Vaping Use: Never used   Substance and Sexual Activity   • Alcohol use: No   • Drug use: No   • Sexual activity: Yes     Partners: Male     Birth control/protection: Female Sterilization   Other Topics Concern   • None   Social History Narrative    Drinks coffee    Exercise habits     Social Determinants of Health     Financial Resource Strain: Not on file   Food Insecurity: Not on file   Transportation Needs: Not on file   Physical Activity: Not on file   Stress: Not on file   Social Connections: Not on file   Intimate Partner Violence: Not on file   Housing Stability: Not on file     Current Outpatient Medications on File Prior to Visit   Medication Sig   • cycloSPORINE (RESTASIS) 0 05 % ophthalmic emulsion 1 drop 2 (two) times a day   • multivitamin (THERAGRAN) TABS Take 1 tablet by mouth daily   • Probiotic Product (PROBIOTIC DAILY PO) Take by mouth   • TURMERIC PO Take by mouth     Allergies   Allergen Reactions   • Penicillins Hives     Immunization History   Administered Date(s) Administered   • INFLUENZA 09/20/2020   • Influenza Injectable, MDCK, Preservative Free, Quadrivalent, 0 5 mL 09/20/2020   • Tdap 02/18/2020       Objective     /80 (BP Location: Left arm, Patient Position: Sitting, Cuff Size: Standard)   Pulse 67   Temp 97 5 °F (36 4 °C) (Temporal)   Resp 16   Ht 5' 2" (1 575 m)   Wt 61 2 kg (135 lb)   SpO2 99%   BMI 24 69 kg/m²     Physical Exam  Vitals and nursing note reviewed  Constitutional:       General: She is not in acute distress  Appearance: She is well-developed  She is not diaphoretic  HENT:      Head: Normocephalic and atraumatic  Right Ear: External ear normal       Left Ear: External ear normal    Eyes:      Pupils: Pupils are equal, round, and reactive to light  Cardiovascular:      Rate and Rhythm: Normal rate and regular rhythm  Heart sounds: Normal heart sounds  Pulmonary:      Effort: Pulmonary effort is normal       Breath sounds: Normal breath sounds  Abdominal:      Palpations: Abdomen is soft  Musculoskeletal:         General: Normal range of motion  Cervical back: Normal range of motion and neck supple  Skin:     General: Skin is dry  Neurological:      Mental Status: She is alert and oriented to person, place, and time  Psychiatric:         Behavior: Behavior normal          Thought Content:  Thought content normal        FRANK Clarke

## 2023-01-20 ENCOUNTER — APPOINTMENT (OUTPATIENT)
Dept: LAB | Facility: MEDICAL CENTER | Age: 58
End: 2023-01-20

## 2023-01-20 DIAGNOSIS — Z00.00 ROUTINE ADULT HEALTH MAINTENANCE: ICD-10-CM

## 2023-01-20 DIAGNOSIS — E78.2 MIXED HYPERLIPIDEMIA: ICD-10-CM

## 2023-01-20 LAB
ALBUMIN SERPL BCP-MCNC: 3.9 G/DL (ref 3.5–5)
ALP SERPL-CCNC: 69 U/L (ref 46–116)
ALT SERPL W P-5'-P-CCNC: 37 U/L (ref 12–78)
ANION GAP SERPL CALCULATED.3IONS-SCNC: 9 MMOL/L (ref 4–13)
AST SERPL W P-5'-P-CCNC: 27 U/L (ref 5–45)
BASOPHILS # BLD AUTO: 0.03 THOUSANDS/ÂΜL (ref 0–0.1)
BASOPHILS NFR BLD AUTO: 1 % (ref 0–1)
BILIRUB SERPL-MCNC: 1.13 MG/DL (ref 0.2–1)
BILIRUB UR QL STRIP: NEGATIVE
BUN SERPL-MCNC: 23 MG/DL (ref 5–25)
CALCIUM SERPL-MCNC: 9.6 MG/DL (ref 8.3–10.1)
CHLORIDE SERPL-SCNC: 105 MMOL/L (ref 96–108)
CHOLEST SERPL-MCNC: 277 MG/DL
CLARITY UR: CLEAR
CO2 SERPL-SCNC: 23 MMOL/L (ref 21–32)
COLOR UR: COLORLESS
CREAT SERPL-MCNC: 0.86 MG/DL (ref 0.6–1.3)
EOSINOPHIL # BLD AUTO: 0.05 THOUSAND/ÂΜL (ref 0–0.61)
EOSINOPHIL NFR BLD AUTO: 1 % (ref 0–6)
ERYTHROCYTE [DISTWIDTH] IN BLOOD BY AUTOMATED COUNT: 14 % (ref 11.6–15.1)
GFR SERPL CREATININE-BSD FRML MDRD: 75 ML/MIN/1.73SQ M
GLUCOSE P FAST SERPL-MCNC: 85 MG/DL (ref 65–99)
GLUCOSE UR STRIP-MCNC: NEGATIVE MG/DL
HCT VFR BLD AUTO: 44.7 % (ref 34.8–46.1)
HDLC SERPL-MCNC: 72 MG/DL
HGB BLD-MCNC: 14.3 G/DL (ref 11.5–15.4)
HGB UR QL STRIP.AUTO: NEGATIVE
IMM GRANULOCYTES # BLD AUTO: 0.01 THOUSAND/UL (ref 0–0.2)
IMM GRANULOCYTES NFR BLD AUTO: 0 % (ref 0–2)
KETONES UR STRIP-MCNC: NEGATIVE MG/DL
LDLC SERPL CALC-MCNC: 182 MG/DL (ref 0–100)
LEUKOCYTE ESTERASE UR QL STRIP: NEGATIVE
LYMPHOCYTES # BLD AUTO: 2.06 THOUSANDS/ÂΜL (ref 0.6–4.47)
LYMPHOCYTES NFR BLD AUTO: 42 % (ref 14–44)
MCH RBC QN AUTO: 29.8 PG (ref 26.8–34.3)
MCHC RBC AUTO-ENTMCNC: 32 G/DL (ref 31.4–37.4)
MCV RBC AUTO: 93 FL (ref 82–98)
MONOCYTES # BLD AUTO: 0.55 THOUSAND/ÂΜL (ref 0.17–1.22)
MONOCYTES NFR BLD AUTO: 11 % (ref 4–12)
NEUTROPHILS # BLD AUTO: 2.26 THOUSANDS/ÂΜL (ref 1.85–7.62)
NEUTS SEG NFR BLD AUTO: 45 % (ref 43–75)
NITRITE UR QL STRIP: NEGATIVE
NONHDLC SERPL-MCNC: 205 MG/DL
NRBC BLD AUTO-RTO: 0 /100 WBCS
PH UR STRIP.AUTO: 6 [PH]
PLATELET # BLD AUTO: 306 THOUSANDS/UL (ref 149–390)
PMV BLD AUTO: 11.2 FL (ref 8.9–12.7)
POTASSIUM SERPL-SCNC: 4.1 MMOL/L (ref 3.5–5.3)
PROT SERPL-MCNC: 8.2 G/DL (ref 6.4–8.4)
PROT UR STRIP-MCNC: NEGATIVE MG/DL
RBC # BLD AUTO: 4.8 MILLION/UL (ref 3.81–5.12)
SODIUM SERPL-SCNC: 137 MMOL/L (ref 135–147)
SP GR UR STRIP.AUTO: 1.01 (ref 1–1.03)
TRIGL SERPL-MCNC: 116 MG/DL
TSH SERPL DL<=0.05 MIU/L-ACNC: 1.94 UIU/ML (ref 0.45–4.5)
UROBILINOGEN UR STRIP-ACNC: <2 MG/DL
WBC # BLD AUTO: 4.96 THOUSAND/UL (ref 4.31–10.16)

## 2023-01-23 ENCOUNTER — TELEPHONE (OUTPATIENT)
Dept: FAMILY MEDICINE CLINIC | Facility: CLINIC | Age: 58
End: 2023-01-23

## 2023-01-23 NOTE — TELEPHONE ENCOUNTER
Pt called looking for the results of her bloodwork  She saw them in her Mychart but does not understand them  Please review and advise

## 2023-01-26 ENCOUNTER — RA CDI HCC (OUTPATIENT)
Dept: OTHER | Facility: HOSPITAL | Age: 58
End: 2023-01-26

## 2023-01-26 NOTE — PROGRESS NOTES
[facility] 75  coding opportunities       Chart reviewed, no opportunity found: CHART REVIEWED, NO OPPORTUNITY FOUND        Patients Insurance        Commercial Insurance: [employer]

## 2023-01-27 ENCOUNTER — OFFICE VISIT (OUTPATIENT)
Dept: FAMILY MEDICINE CLINIC | Facility: CLINIC | Age: 58
End: 2023-01-27

## 2023-01-27 ENCOUNTER — HOSPITAL ENCOUNTER (OUTPATIENT)
Dept: RADIOLOGY | Facility: MEDICAL CENTER | Age: 58
Discharge: HOME | End: 2023-01-27

## 2023-01-27 VITALS
BODY MASS INDEX: 24.48 KG/M2 | RESPIRATION RATE: 18 BRPM | HEIGHT: 62 IN | TEMPERATURE: 97.9 F | HEART RATE: 60 BPM | SYSTOLIC BLOOD PRESSURE: 126 MMHG | OXYGEN SATURATION: 98 % | WEIGHT: 133 LBS | DIASTOLIC BLOOD PRESSURE: 70 MMHG

## 2023-01-27 DIAGNOSIS — E78.2 MIXED HYPERLIPIDEMIA: Primary | ICD-10-CM

## 2023-01-27 DIAGNOSIS — Z12.31 ENCOUNTER FOR SCREENING MAMMOGRAM FOR MALIGNANT NEOPLASM OF BREAST: ICD-10-CM

## 2023-01-27 RX ORDER — ROSUVASTATIN CALCIUM 5 MG/1
5 TABLET, COATED ORAL DAILY
Qty: 90 TABLET | Refills: 1 | Status: SHIPPED | OUTPATIENT
Start: 2023-01-27

## 2023-01-27 NOTE — PROGRESS NOTES
Name: Dee Poe      : 1965      MRN: [id number]  Encounter Provider: FRANK Mcpherson  Encounter Date: 2023   Encounter department: [address] [place] [address] Place     1  Mixed hyperlipidemia  -     rosuvastatin (CRESTOR) 5 mg tablet; Take 1 tablet (5 mg total) by mouth daily  -     CBC and differential; Future  -     Comprehensive metabolic panel; Future  -     Lipid panel; Future  -     UA w Reflex to Microscopic w Reflex to Culture         Physical assessment unremarkable vital signs are always and has today in normal standing  Patient's labs reviewed everything is found to be stable with the exception of her cholesterol cholesterol total is 277 did advise would like to start a low-dose Crestor 5 mg to be taken daily  Would like her to follow-up back with me in the office repeat labs in 6 months to assess follow-up cholesterol  All other questions were answered she is very happy she is definitely willing to start the medication and to return to office  All questions answered happy with the outcome of today's visit  Dosing all possible side effects of the prescribed medications or medications that had been prescribed in the past were reviewed and all questions were answered  Patient verbalized agreement and understanding of the plan of care as outlined during the office visit today return to office as indicated or sooner if a problem arises  Subjective       Patient is here for routine follow-up  To review most recent labs  To also discuss current state of health and any new problems that they may be experiencing  Patient states that medications taken as prescribed and very well tolerated no new complaints at this time  Review of Systems   Constitutional: Negative for appetite change and fever  HENT: Negative for sinus pressure and sore throat  Eyes: Negative for pain  Respiratory: Negative for shortness of breath      Cardiovascular: Negative for chest pain  Gastrointestinal: Negative for abdominal pain  Genitourinary: Negative for dysuria  Musculoskeletal: Negative for arthralgias and myalgias  Skin: Negative for color change  Neurological: Negative for light-headedness  Psychiatric/Behavioral: Negative for behavioral problems  Past Medical History:   Diagnosis Date   • Anemia     iron defficiency   • ASCUS with positive high risk HPV cervical 2015   • Asthma     last assessed - 25Oct2017   • Headache    • History of colonic polyps     Resolved - 2017   • HPV (human papilloma virus) infection    • Hyperlipidemia     diet controlled   • Kidney stone    • Uterine fibroid      Past Surgical History:   Procedure Laterality Date   • BONE BIOPSY Left 6/4/2018    Procedure: FOOT SOFT TISSUE MASS REMOVAL;  Surgeon: Francine Schaumann, DPM;  Location: [facility];  Service: Podiatry   • BREAST BIOPSY Left     Percutaneous needle core; last assessed - [id number]   • BREAST SURGERY     • COLONOSCOPY     • COLPOSCOPY     • COLPOSCOPY W/ BIOPSY / CURETTAGE  04/28/2015    Colposcopy Cervix with Biopsy(s) - COLPO=neg; last assessed - 28Apr2015   • DILATION AND CURETTAGE OF UTERUS     • ENDOMETRIAL BIOPSY  04/28/2015    by suction; EB/COLPO = Negative Results; last assessed - 30Apr2015   • FOOT SURGERY     • LITHOTRIPSY      Renal   • [place] COLONOSCOPY FLX DX W/COLLJ SPEC WHEN PFRMD N/A 12/5/2017    Procedure: COLONOSCOPY;  Surgeon: Cal Fernández MD;  Location: [facility]; Service: Gastroenterology;  Complete Colonoscopy   • TUBAL LIGATION       Family History   Problem Relation Age of Onset   • Arthritis Mother    • Hypertension Mother    • Diabetes Father    • No Known Problems Brother    • No Known Problems Son    • Hypertension Maternal Grandmother    • Hypertension Maternal Grandfather    • No Known Problems Paternal Grandmother    • No Known Problems Paternal Grandfather    • Hypertension Brother    • No Known Problems Brother    • No Known Problems Brother    • No Known Problems Son    • Prostate cancer Maternal Uncle      Social History     Socioeconomic History   • Marital status: Single     Spouse name: None   • Number of children: 2   • Years of education: None   • Highest education level: None   Occupational History   • None   Tobacco Use   • Smoking status: Never   • Smokeless tobacco: Never   Vaping Use   • Vaping Use: Never used   Substance and Sexual Activity   • Alcohol use: No   • Drug use: No   • Sexual activity: Yes     Partners: Male     Birth control/protection: Female Sterilization   Other Topics Concern   • None   Social History Narrative    Drinks coffee    Exercise habits     Social Determinants of Health     Financial Resource Strain: Not on file   Food Insecurity: Not on file   Transportation Needs: Not on file   Physical Activity: Not on file   Stress: Not on file   Social Connections: Not on file   Intimate Partner Violence: Not on file   Housing Stability: Not on file     Current Outpatient Medications on File Prior to Visit   Medication Sig   • cycloSPORINE (RESTASIS) 0 05 % ophthalmic emulsion 1 drop 2 (two) times a day   • multivitamin (THERAGRAN) TABS Take 1 tablet by mouth daily   • Probiotic Product (PROBIOTIC DAILY PO) Take by mouth   • TURMERIC PO Take by mouth     Allergies   Allergen Reactions   • Penicillins Hives     Immunization History   Administered Date(s) Administered   • INFLUENZA 09/20/2020   • Influenza Injectable, MDCK, Preservative Free, Quadrivalent, 0 5 mL 09/20/2020   • Tdap 02/18/2020       Objective     /70 (BP Location: Left arm, Patient Position: Sitting, Cuff Size: Standard)   Pulse 60   Temp 97 9 °F (36 6 °C) (Temporal)   Resp 18   Ht 5' 2" (1 575 m)   Wt 60 3 kg (133 lb)   SpO2 98%   BMI 24 33 kg/m²     Physical Exam  Vitals and nursing note reviewed  Constitutional:       General: She is not in acute distress  Appearance: She is well-developed  She is not diaphoretic     HENT: Head: Normocephalic and atraumatic  Cardiovascular:      Rate and Rhythm: Normal rate and regular rhythm  Heart sounds: Normal heart sounds  Pulmonary:      Effort: Pulmonary effort is normal       Breath sounds: Normal breath sounds  Neurological:      Mental Status: She is alert  Psychiatric:         Behavior: Behavior normal          Thought Content:  Thought content normal        FRANK Castaneda

## 2023-02-06 LAB — COLOGUARD RESULT REPORTABLE: NEGATIVE

## 2023-02-18 DIAGNOSIS — E78.2 MIXED HYPERLIPIDEMIA: ICD-10-CM

## 2023-02-18 RX ORDER — ROSUVASTATIN CALCIUM 5 MG/1
5 TABLET, COATED ORAL DAILY
Qty: 90 TABLET | Refills: 2 | Status: SHIPPED | OUTPATIENT
Start: 2023-02-18

## 2023-06-02 ENCOUNTER — ANNUAL EXAM (OUTPATIENT)
Dept: OBGYN CLINIC | Facility: MEDICAL CENTER | Age: 58
End: 2023-06-02

## 2023-06-02 VITALS — SYSTOLIC BLOOD PRESSURE: 130 MMHG | DIASTOLIC BLOOD PRESSURE: 96 MMHG | BODY MASS INDEX: 25.42 KG/M2 | WEIGHT: 139 LBS

## 2023-06-02 DIAGNOSIS — Z12.31 ENCOUNTER FOR SCREENING MAMMOGRAM FOR MALIGNANT NEOPLASM OF BREAST: Primary | ICD-10-CM

## 2023-06-02 DIAGNOSIS — N95.1 PERIMENOPAUSAL: ICD-10-CM

## 2023-06-02 DIAGNOSIS — Z12.4 ENCOUNTER FOR PAPANICOLAOU SMEAR FOR CERVICAL CANCER SCREENING: ICD-10-CM

## 2023-06-02 DIAGNOSIS — N92.0 SPOTTING: ICD-10-CM

## 2023-06-02 PROCEDURE — G0145 SCR C/V CYTO,THINLAYER,RESCR: HCPCS | Performed by: CLINICAL NURSE SPECIALIST

## 2023-06-02 NOTE — ASSESSMENT & PLAN NOTE
Last period 2/2022, but continues to have intermittent spotting  Also has hx of PCB but not sexually active at present  Difficult to say whether she is postmenopause or not due to spotting  Reviewed menopause is defined as no menses/vaginal bleeding x 12 months  Having spotting may suggest she has not completed menopause transition  Conversely, if menopsausal then spotting may be due to abnormality and should be investigated  Will check US and consider EMB if thickened ET  FSH and estradiol also ordered

## 2023-06-02 NOTE — PROGRESS NOTES
Patient presents for a routine annual visit    Last Pap Smear- 11/11/2020  Mammogram- 01/27/2023  Colonoscopy- 12/05/2017  [id number]  Non smoker  Social drinker  Currently sexually active  No family history of uterine, ovarian, cervical or breast cancer     No concerns/questions for today's visit

## 2023-06-02 NOTE — PROGRESS NOTES
Subjective:        Gregoria Seen is a 65 y o  female  Here for Gynecologic Exam (Patient presents for a routine annual visit/Perimenopause/Last Pap Smear- 2020/Mammogram- 2023/Colonoscopy- 2017)      GYN HPI  Here for annual gyn exam  Last menses was about a year ago (2022), but has had some intermittent spotting   Occasional hot flashes  Has hx of PCB with nml workup   Not sexually active at present  Vaginal c/o: denies  Urinary c/o: denies  Breast complaints:denies  She does do self breast Exams    Sexually active: none  Contraception: n/a  She reports she feels safe at home  The following portions of the patient's history were reviewed and updated as appropriate: allergies, current medications, past family history, past medical history, past social history, past surgical history, and problem list     Hereditary Cancer Screening  Cancer-related family history includes Prostate cancer in her maternal uncle  There is no history of Breast cancer, Colon cancer, Ovarian cancer, or Uterine cancer  Substance Abuse Screening Completed  See hx and flowsheet  Screens Positive for none       HEALTH MAINTENANCE SCREENINGS:    History of abnormal pap: Yes, remote hx, Last Papanicolaou test:  2020  History of abnormal mammogram: yes- but benign findings, Last mammogram:  2023  Last Colon Cancer Screenin2023 Not on file 2023      Review of Systems   Constitutional: Negative for appetite change, chills, fatigue, fever and unexpected weight change  HENT: Negative  Eyes: Negative  Respiratory: Negative for chest tightness and shortness of breath  Cardiovascular: Negative for chest pain and palpitations  Gastrointestinal: Negative for abdominal pain, constipation and vomiting  Endocrine: Negative for cold intolerance and heat intolerance  Genitourinary:        As per HPI   Musculoskeletal: Negative for back pain, joint swelling and neck pain     Skin: Negative for color change and rash  Neurological: Negative for dizziness, weakness and numbness  Hematological: Does not bruise/bleed easily  Psychiatric/Behavioral: Negative  Objective:  /96 (BP Location: Left arm, Patient Position: Sitting, Cuff Size: Standard)   Wt 63 kg (139 lb)   BMI 25 42 kg/m²        Physical Exam  Constitutional:       General: She is not in acute distress  Appearance: Normal appearance  Genitourinary:      Vulva and rectum normal       No lesions in the vagina  Right Labia: No rash or lesions  Left Labia: No lesions or rash  No vaginal discharge, erythema, tenderness or bleeding  Right Adnexa: not tender and no mass present  Left Adnexa: not tender and no mass present  No cervical motion tenderness, discharge or friability  Uterus is not enlarged or tender  No urethral prolapse present  Pelvic exam was performed with patient in the lithotomy position  Breasts:     Breasts are symmetrical       Right: No inverted nipple, mass, nipple discharge, skin change or tenderness  Left: No inverted nipple, mass, nipple discharge, skin change or tenderness  HENT:      Head: Normocephalic and atraumatic  Cardiovascular:      Rate and Rhythm: Normal rate  Heart sounds: No murmur heard  Pulmonary:      Effort: Pulmonary effort is normal       Breath sounds: Normal breath sounds  Abdominal:      General: There is no distension  Palpations: Abdomen is soft  Tenderness: There is no abdominal tenderness  Musculoskeletal:         General: Normal range of motion  Cervical back: Normal range of motion  Lymphadenopathy:      Cervical: No cervical adenopathy  Neurological:      Mental Status: She is alert and oriented to person, place, and time  Skin:     General: Skin is warm and dry  Psychiatric:         Mood and Affect: Mood normal          Behavior: Behavior normal    Vitals reviewed  Assessment/Plan:           Joaquim Stanley- Primary  Annual GYN examination completed today  Risk prevention and anticipatory guidance provided including:  • Risk for hereditary cancers: Family history reviewed and patient does not qualify for referral for genetics consult/testing  Referral to genetics oncology offered as indicated  , Colon Cancer Screening: She Is up to date on screening; Next due 2027  • Calcium and vitamin D supplementation  • Dietary and lifestyle recommendations based on her age and weight  body mass index is 25 42 kg/m²       • Tobacco and alcohol use, intervention ordered if applicable  Cervical cancer screening  Previous pap smears and ASCCP screening guidelines have been reviewed  Pap smear is indicated at this time  Contraception   N/A- not sexually active    STI Screening  STI screening is desired   STI prevention discussed with use of condoms  Breast exam and breast cancer screening  Breast exam was done; , Reviewed recommendation for yearly screening mammogram (as per ACOG, ACS, and [address] [place] [address] Departments), however, also made her aware that there are other professional organizations that may recommend deferring mammograms until age 48 or screening every other year  CBE should still be done yearly, but may miss some cancers and alone is not as effective as breast imaging  Supplemental testing may also be indicated based on lifetime risk for breast cancer as done by [facility] imaging , She is up to date with screening; Next due 1/24    Problem List Items Addressed This Visit     Spotting     Last period 2/2022, but continues to have intermittent spotting  Also has hx of PCB but not sexually active at present  Difficult to say whether she is postmenopause or not due to spotting  Reviewed menopause is defined as no menses/vaginal bleeding x 12 months  Having spotting may suggest she has not completed menopause transition   Conversely, if menopsausal then spotting may be due to abnormality and should be investigated  Will check US and consider EMB if thickened ET  FSH and estradiol also ordered           Relevant Orders    US pelvis complete w transvaginal    Follicle stimulating hormone    Estradiol   Other Visit Diagnoses     Encounter for screening mammogram for malignant neoplasm of breast    -  Primary    Relevant Orders    Mammo screening bilateral w 3d & cad    Encounter for Papanicolaou smear for cervical cancer screening        Relevant Orders    Liquid-based pap, screening    Perimenopausal        Relevant Orders    US pelvis complete w transvaginal    Follicle stimulating hormone    Estradiol          Orders Placed This Encounter   Procedures   • Mammo screening bilateral w 3d & cad   • US pelvis complete w transvaginal   • Follicle stimulating hormone   • Estradiol

## 2023-06-05 ENCOUNTER — APPOINTMENT (OUTPATIENT)
Dept: LAB | Facility: MEDICAL CENTER | Age: 58
End: 2023-06-05
Payer: COMMERCIAL

## 2023-06-05 DIAGNOSIS — N95.1 PERIMENOPAUSAL: ICD-10-CM

## 2023-06-05 DIAGNOSIS — E78.2 MIXED HYPERLIPIDEMIA: ICD-10-CM

## 2023-06-05 DIAGNOSIS — N92.0 SPOTTING: ICD-10-CM

## 2023-06-05 LAB
ALBUMIN SERPL BCP-MCNC: 3.9 G/DL (ref 3.5–5)
ALP SERPL-CCNC: 69 U/L (ref 46–116)
ALT SERPL W P-5'-P-CCNC: 25 U/L (ref 12–78)
ANION GAP SERPL CALCULATED.3IONS-SCNC: 1 MMOL/L (ref 4–13)
AST SERPL W P-5'-P-CCNC: 17 U/L (ref 5–45)
BACTERIA UR QL AUTO: ABNORMAL /HPF
BASOPHILS # BLD AUTO: 0.03 THOUSANDS/ÂΜL (ref 0–0.1)
BASOPHILS NFR BLD AUTO: 1 % (ref 0–1)
BILIRUB SERPL-MCNC: 1.05 MG/DL (ref 0.2–1)
BILIRUB UR QL STRIP: NEGATIVE
BUN SERPL-MCNC: 17 MG/DL (ref 5–25)
CALCIUM SERPL-MCNC: 9.6 MG/DL (ref 8.3–10.1)
CHLORIDE SERPL-SCNC: 110 MMOL/L (ref 96–108)
CHOLEST SERPL-MCNC: 196 MG/DL
CLARITY UR: CLEAR
CO2 SERPL-SCNC: 26 MMOL/L (ref 21–32)
COLOR UR: ABNORMAL
CREAT SERPL-MCNC: 0.86 MG/DL (ref 0.6–1.3)
EOSINOPHIL # BLD AUTO: 0.06 THOUSAND/ÂΜL (ref 0–0.61)
EOSINOPHIL NFR BLD AUTO: 1 % (ref 0–6)
ERYTHROCYTE [DISTWIDTH] IN BLOOD BY AUTOMATED COUNT: 14 % (ref 11.6–15.1)
ESTRADIOL SERPL-MCNC: 17.7 PG/ML
FSH SERPL-ACNC: 100.6 MIU/ML
GFR SERPL CREATININE-BSD FRML MDRD: 75 ML/MIN/1.73SQ M
GLUCOSE P FAST SERPL-MCNC: 91 MG/DL (ref 65–99)
GLUCOSE UR STRIP-MCNC: NEGATIVE MG/DL
HCT VFR BLD AUTO: 43.7 % (ref 34.8–46.1)
HDLC SERPL-MCNC: 63 MG/DL
HGB BLD-MCNC: 13.8 G/DL (ref 11.5–15.4)
HGB UR QL STRIP.AUTO: ABNORMAL
IMM GRANULOCYTES # BLD AUTO: 0.01 THOUSAND/UL (ref 0–0.2)
IMM GRANULOCYTES NFR BLD AUTO: 0 % (ref 0–2)
KETONES UR STRIP-MCNC: NEGATIVE MG/DL
LDLC SERPL CALC-MCNC: 112 MG/DL (ref 0–100)
LEUKOCYTE ESTERASE UR QL STRIP: NEGATIVE
LYMPHOCYTES # BLD AUTO: 1.73 THOUSANDS/ÂΜL (ref 0.6–4.47)
LYMPHOCYTES NFR BLD AUTO: 39 % (ref 14–44)
MCH RBC QN AUTO: 30.3 PG (ref 26.8–34.3)
MCHC RBC AUTO-ENTMCNC: 31.6 G/DL (ref 31.4–37.4)
MCV RBC AUTO: 96 FL (ref 82–98)
MONOCYTES # BLD AUTO: 0.41 THOUSAND/ÂΜL (ref 0.17–1.22)
MONOCYTES NFR BLD AUTO: 9 % (ref 4–12)
MUCOUS THREADS UR QL AUTO: ABNORMAL
NEUTROPHILS # BLD AUTO: 2.25 THOUSANDS/ÂΜL (ref 1.85–7.62)
NEUTS SEG NFR BLD AUTO: 50 % (ref 43–75)
NITRITE UR QL STRIP: NEGATIVE
NON-SQ EPI CELLS URNS QL MICRO: ABNORMAL /HPF
NONHDLC SERPL-MCNC: 133 MG/DL
NRBC BLD AUTO-RTO: 0 /100 WBCS
PH UR STRIP.AUTO: 6 [PH]
PLATELET # BLD AUTO: 280 THOUSANDS/UL (ref 149–390)
PMV BLD AUTO: 11.8 FL (ref 8.9–12.7)
POTASSIUM SERPL-SCNC: 4.1 MMOL/L (ref 3.5–5.3)
PROT SERPL-MCNC: 8 G/DL (ref 6.4–8.4)
PROT UR STRIP-MCNC: ABNORMAL MG/DL
RBC # BLD AUTO: 4.55 MILLION/UL (ref 3.81–5.12)
RBC #/AREA URNS AUTO: ABNORMAL /HPF
SODIUM SERPL-SCNC: 137 MMOL/L (ref 135–147)
SP GR UR STRIP.AUTO: 1.02 (ref 1–1.03)
TRIGL SERPL-MCNC: 107 MG/DL
UROBILINOGEN UR STRIP-ACNC: <2 MG/DL
WBC # BLD AUTO: 4.49 THOUSAND/UL (ref 4.31–10.16)
WBC #/AREA URNS AUTO: ABNORMAL /HPF

## 2023-06-05 PROCEDURE — 80061 LIPID PANEL: CPT

## 2023-06-05 PROCEDURE — 83001 ASSAY OF GONADOTROPIN (FSH): CPT

## 2023-06-05 PROCEDURE — 85025 COMPLETE CBC W/AUTO DIFF WBC: CPT

## 2023-06-05 PROCEDURE — 80053 COMPREHEN METABOLIC PANEL: CPT

## 2023-06-05 PROCEDURE — 82670 ASSAY OF TOTAL ESTRADIOL: CPT

## 2023-06-05 PROCEDURE — 36415 COLL VENOUS BLD VENIPUNCTURE: CPT

## 2023-06-07 ENCOUNTER — TELEMEDICINE (OUTPATIENT)
Dept: FAMILY MEDICINE CLINIC | Facility: CLINIC | Age: 58
End: 2023-06-07
Payer: COMMERCIAL

## 2023-06-07 DIAGNOSIS — E78.2 MIXED HYPERLIPIDEMIA: Primary | ICD-10-CM

## 2023-06-07 PROCEDURE — 99442 PR PHYS/QHP TELEPHONE EVALUATION 11-20 MIN: CPT | Performed by: NURSE PRACTITIONER

## 2023-06-07 NOTE — PROGRESS NOTES
Virtual Brief Visit    This Visit is being completed by telephone  The Patient is located at Home and in the following state in which I hold an active license [place]    The patient was identified by name and date of birth  Nohemy Yao was informed that this is a telemedicine visit and that the visit is being conducted through Telephone  My office door was closed  No one else was in the room  She acknowledged consent and understanding of privacy and security of the video platform  The patient has agreed to participate and understands they can discontinue the visit at any time  Patient is aware this is a billable service  Assessment/Plan:    Problem List Items Addressed This Visit    None  Visit Diagnoses     Mixed hyperlipidemia    -  Primary      Physical assessment not possible with a virtual telephone visit  Patient was concerned about possible side effects to the Crestor did reassure her that the side effects which he described jitteriness and increase in slight constipation are very rarely side effects of Crestor  I advised that these could actually be symptoms of menopause  Which she states she has been in for the last 4 to 5 months  I did also review with her her carotid cholesterol has come to within normal limits on this low-dose medication she states she will stay with this medication and recheck labs in 6 months  Dosing all possible side effects of the prescribed medications or medications that had been prescribed in the past were reviewed and all questions were answered  Patient verbalized agreement and understanding of the plan of care as outlined during the office visit today return to office as indicated or sooner if a problem arises  Recent Visits  No visits were found meeting these conditions    Showing recent visits within past 7 days and meeting all other requirements  Today's Visits  Date Type Provider Dept   06/07/23 [address] [facility] [address] [facility]   Showing today's visits and meeting all other requirements  Future Appointments  No visits were found meeting these conditions    Showing future appointments within next 150 days and meeting all other requirements         Visit Time  Total Visit Duration: 18 minutes

## 2023-06-11 LAB
LAB AP GYN PRIMARY INTERPRETATION: NORMAL
Lab: NORMAL

## 2023-07-26 DIAGNOSIS — Z00.00 ROUTINE ADULT HEALTH MAINTENANCE: Primary | ICD-10-CM

## 2023-07-26 DIAGNOSIS — E78.2 MIXED HYPERLIPIDEMIA: ICD-10-CM

## 2023-08-03 DIAGNOSIS — E78.2 MIXED HYPERLIPIDEMIA: ICD-10-CM

## 2023-08-03 NOTE — TELEPHONE ENCOUNTER
Patient requesting refill since the old prescription was cancelled and has been filling at [facility]

## 2023-08-07 RX ORDER — ROSUVASTATIN CALCIUM 5 MG/1
5 TABLET, COATED ORAL DAILY
Qty: 90 TABLET | Refills: 2 | Status: SHIPPED | OUTPATIENT
Start: 2023-08-07

## 2023-10-30 ENCOUNTER — OFFICE VISIT (OUTPATIENT)
Dept: FAMILY MEDICINE CLINIC | Facility: CLINIC | Age: 58
End: 2023-10-30
Payer: COMMERCIAL

## 2023-10-30 VITALS
SYSTOLIC BLOOD PRESSURE: 122 MMHG | HEART RATE: 60 BPM | DIASTOLIC BLOOD PRESSURE: 74 MMHG | OXYGEN SATURATION: 99 % | RESPIRATION RATE: 18 BRPM | HEIGHT: 62 IN | BODY MASS INDEX: 25.25 KG/M2 | TEMPERATURE: 97.9 F | WEIGHT: 137.2 LBS

## 2023-10-30 DIAGNOSIS — A69.20 LYME DISEASE: Primary | ICD-10-CM

## 2023-10-30 PROCEDURE — 99213 OFFICE O/P EST LOW 20 MIN: CPT | Performed by: FAMILY MEDICINE

## 2023-10-30 RX ORDER — DOXYCYCLINE HYCLATE 100 MG/1
100 CAPSULE ORAL EVERY 12 HOURS SCHEDULED
Qty: 42 CAPSULE | Refills: 0 | Status: SHIPPED | OUTPATIENT
Start: 2023-10-30 | End: 2023-11-20

## 2023-10-30 NOTE — PROGRESS NOTES
Name: Summer Dunn      : 1965      MRN: [id number]  Encounter Provider: Gaetano Hopper MD  Encounter Date: 10/30/2023   Encounter department: [address] [place] [address] Avenue     1. Lyme disease  -     doxycycline hyclate (VIBRAMYCIN) 100 mg capsule; Take 1 capsule (100 mg total) by mouth every 12 (twelve) hours for 21 days         Subjective     Is a 60-year-old female today for checkup on a recent tick bite. Patient brought her tick with her that she removed from her right back. The tick is intact but it is  appears to be a deer tick. The patient's wound was examined its about 3 to 4 mm wide there are no mouthparts visible and in the wound and the wound is clean. She states that the tick was probably on no more than 24 hours. Patient is elected to be treated for Lyme disease we will get her doxycycline 100 mg twice a day for the next 21 days. Review of Systems   Constitutional:  Negative for activity change, appetite change, fatigue and fever. HENT:  Negative for congestion, ear pain, postnasal drip, rhinorrhea, sinus pressure, sinus pain, sneezing and sore throat. Eyes:  Negative for pain and redness. Respiratory:  Negative for apnea, cough, chest tightness, shortness of breath and wheezing. Cardiovascular:  Negative for chest pain, palpitations and leg swelling. Gastrointestinal:  Negative for abdominal pain, constipation, diarrhea, nausea and vomiting. Endocrine: Negative for cold intolerance and heat intolerance. Genitourinary:  Negative for difficulty urinating, dysuria, frequency, hematuria and urgency. Musculoskeletal:  Negative for arthralgias, back pain, gait problem and myalgias. Skin:  Negative for rash. Neurological:  Negative for dizziness, speech difficulty, weakness, numbness and headaches. Hematological:  Does not bruise/bleed easily. Psychiatric/Behavioral:  Negative for agitation, confusion and hallucinations. Past Medical History:   Diagnosis Date   • Anemia     iron defficiency   • ASCUS with positive high risk HPV cervical 2015   • Asthma     last assessed - 25Oct2017   • Headache    • History of colonic polyps     Resolved - 2017   • HPV (human papilloma virus) infection    • Hyperlipidemia     diet controlled   • Kidney stone    • Uterine fibroid      Past Surgical History:   Procedure Laterality Date   • BONE BIOPSY Left 6/4/2018    Procedure: FOOT SOFT TISSUE MASS REMOVAL;  Surgeon: Osmel Mckinley DPM;  Location: [facility];  Service: Podiatry   • BREAST BIOPSY Left     Percutaneous needle core; last assessed - [id number]   • BREAST SURGERY     • COLONOSCOPY     • COLPOSCOPY     • COLPOSCOPY W/ BIOPSY / CURETTAGE  04/28/2015    Colposcopy Cervix with Biopsy(s) - COLPO=neg; last assessed - 28Apr2015   • DILATION AND CURETTAGE OF UTERUS     • ENDOMETRIAL BIOPSY  04/28/2015    by suction; EB/COLPO = Negative Results; last assessed - 30Apr2015   • FOOT SURGERY     • LITHOTRIPSY      Renal   • AL COLONOSCOPY FLX DX W/COLLJ SPEC WHEN PFRMD N/A 12/5/2017    Procedure: COLONOSCOPY;  Surgeon: Maximino Mahajan MD;  Location: [facility]; Service: Gastroenterology;  Complete Colonoscopy   • TUBAL LIGATION       Family History   Problem Relation Age of Onset   • Arthritis Mother    • Hypertension Mother    • Diabetes Father    • No Known Problems Brother    • Hypertension Brother    • No Known Problems Brother    • No Known Problems Brother    • No Known Problems Son    • No Known Problems Son    • Hypertension Maternal Grandmother    • Hypertension Maternal Grandfather    • No Known Problems Paternal Grandmother    • No Known Problems Paternal Grandfather    • Prostate cancer Maternal Uncle    • Breast cancer Neg Hx    • Colon cancer Neg Hx    • Ovarian cancer Neg Hx    • Uterine cancer Neg Hx      Social History     Socioeconomic History   • Marital status: Single     Spouse name: None   • Number of children: 2 • Years of education: None   • Highest education level: None   Occupational History   • None   Tobacco Use   • Smoking status: Never   • Smokeless tobacco: Never   Vaping Use   • Vaping Use: Never used   Substance and Sexual Activity   • Alcohol use: No   • Drug use: No   • Sexual activity: Not Currently     Partners: Male     Birth control/protection: Female Sterilization   Other Topics Concern   • None   Social History Narrative    Drinks coffee    Exercise habits     Social Determinants of Health     Financial Resource Strain: Not on file   Food Insecurity: Not on file   Transportation Needs: Not on file   Physical Activity: Not on file   Stress: Not on file   Social Connections: Not on file   Intimate Partner Violence: Not on file   Housing Stability: Not on file     Current Outpatient Medications on File Prior to Visit   Medication Sig   • cycloSPORINE (RESTASIS) 0.05 % ophthalmic emulsion 1 drop 2 (two) times a day   • multivitamin (THERAGRAN) TABS Take 1 tablet by mouth daily   • Probiotic Product (PROBIOTIC DAILY PO) Take by mouth   • rosuvastatin (CRESTOR) 5 mg tablet Take 1 tablet (5 mg total) by mouth daily   • TURMERIC PO Take by mouth     Allergies   Allergen Reactions   • Penicillins Hives     Immunization History   Administered Date(s) Administered   • INFLUENZA 09/20/2020   • Influenza Injectable, MDCK, Preservative Free, Quadrivalent, 0.5 mL 09/20/2020   • Tdap 02/18/2020       Objective     /74 (BP Location: Left arm, Patient Position: Sitting, Cuff Size: Standard)   Pulse 60   Temp 97.9 °F (36.6 °C)   Resp 18   Ht 5' 2" (1.575 m)   Wt 62.2 kg (137 lb 3.2 oz)   SpO2 99%   BMI 25.09 kg/m²     Physical Exam  Skin:     General: Skin is warm.              Comments: Tick bite       Joanna Loera MD

## 2023-11-03 ENCOUNTER — TELEPHONE (OUTPATIENT)
Age: 58
End: 2023-11-03

## 2023-11-03 DIAGNOSIS — A69.20 LYME DISEASE: Primary | ICD-10-CM

## 2023-11-03 RX ORDER — AMOXICILLIN 500 MG/1
500 TABLET, FILM COATED ORAL 3 TIMES DAILY
Qty: 63 TABLET | Refills: 0 | Status: SHIPPED | OUTPATIENT
Start: 2023-11-03 | End: 2023-11-24

## 2023-11-03 NOTE — TELEPHONE ENCOUNTER
done Sunscreen Recommendations: Continual photo protection with zinc based sunscreen, and photo protective clothing Detail Level: Zone

## 2023-11-03 NOTE — TELEPHONE ENCOUNTER
Patient called and states she is having very bad nausea from Doxycycline. Would like to know what she can do or take to help with nausea.  Pharmacy [facility] in [facility] and patient can be reached at [phone]

## 2023-11-27 ENCOUNTER — TELEPHONE (OUTPATIENT)
Dept: OBGYN CLINIC | Facility: CLINIC | Age: 58
End: 2023-11-27

## 2023-12-13 ENCOUNTER — OFFICE VISIT (OUTPATIENT)
Dept: FAMILY MEDICINE CLINIC | Facility: CLINIC | Age: 58
End: 2023-12-13
Payer: COMMERCIAL

## 2023-12-13 VITALS
WEIGHT: 137 LBS | HEIGHT: 62 IN | RESPIRATION RATE: 16 BRPM | BODY MASS INDEX: 25.21 KG/M2 | SYSTOLIC BLOOD PRESSURE: 120 MMHG | HEART RATE: 50 BPM | OXYGEN SATURATION: 99 % | TEMPERATURE: 97.5 F | DIASTOLIC BLOOD PRESSURE: 86 MMHG

## 2023-12-13 DIAGNOSIS — J01.10 SUBACUTE FRONTAL SINUSITIS: Primary | ICD-10-CM

## 2023-12-13 PROCEDURE — 99213 OFFICE O/P EST LOW 20 MIN: CPT | Performed by: NURSE PRACTITIONER

## 2023-12-13 RX ORDER — CIPROFLOXACIN 500 MG/1
500 TABLET, FILM COATED ORAL EVERY 12 HOURS SCHEDULED
Qty: 14 TABLET | Refills: 0 | Status: SHIPPED | OUTPATIENT
Start: 2023-12-13 | End: 2023-12-20

## 2023-12-13 RX ORDER — PREDNISONE 10 MG/1
10 TABLET ORAL 2 TIMES DAILY WITH MEALS
Qty: 10 TABLET | Refills: 0 | Status: SHIPPED | OUTPATIENT
Start: 2023-12-13 | End: 2023-12-18

## 2023-12-13 NOTE — PROGRESS NOTES
Assessment/Plan:      Diagnoses and all orders for this visit:    Subacute frontal sinusitis  -     ciprofloxacin (CIPRO) 500 mg tablet; Take 1 tablet (500 mg total) by mouth every 12 (twelve) hours for 7 days  -     predniSONE 10 mg tablet; Take 1 tablet (10 mg total) by mouth 2 (two) times a day with meals for 5 days        15-year-old female here today with an upper respiratory type complaint. He does have an uncomplicated sinus infection. Did advise we will send prednisone twice daily for 5 days to the pharmacy for her sinus pressure. Also sent ciprofloxacin for the right ear that she does have in the red throat. Contact me back in the office if fails to improve. Dosing all possible side effects of the prescribed medications or medications that had been prescribed in the past were reviewed and all questions were answered. Patient verbalized agreement and understanding of the plan of care as outlined during the office visit today return to office as indicated or sooner if a problem arises. Subjective:     Patient ID: Irina Martel is a 65 y.o. female. Patient is here with a complaint of upper respiratory tract discomfort has had a cough runny nose and some nasal congestion. Denies any need nausea vomiting diarrhea chest pains or shortness of breath. All over-the-counter medication attempted arm were unsuccessful. Sinus Problem  Associated symptoms include chills, congestion, coughing and a sore throat. Review of Systems   Constitutional:  Positive for chills. HENT:  Positive for congestion, rhinorrhea and sore throat. Respiratory:  Positive for cough. Objective:     Physical Exam  Constitutional:       General: She is not in acute distress. Appearance: She is not diaphoretic. HENT:      Head: Atraumatic. Right Ear: Tympanic membrane normal.      Left Ear: Tympanic membrane normal.      Nose: Mucosal edema, congestion and rhinorrhea present.       Right Sinus: Frontal sinus tenderness present. Left Sinus: Frontal sinus tenderness present. Cardiovascular:      Rate and Rhythm: Normal rate and regular rhythm. Heart sounds: Normal heart sounds. Pulmonary:      Effort: Pulmonary effort is normal.      Breath sounds: Normal breath sounds. Musculoskeletal:         General: Normal range of motion. Cervical back: Normal range of motion.

## 2024-01-26 ENCOUNTER — TELEPHONE (OUTPATIENT)
Dept: ADMINISTRATIVE | Facility: OTHER | Age: 59
End: 2024-01-26

## 2024-01-26 ENCOUNTER — TELEPHONE (OUTPATIENT)
Dept: OBGYN CLINIC | Facility: CLINIC | Age: 59
End: 2024-01-26

## 2024-01-26 ENCOUNTER — APPOINTMENT (OUTPATIENT)
Dept: LAB | Facility: MEDICAL CENTER | Age: 59
End: 2024-01-26
Payer: COMMERCIAL

## 2024-01-26 ENCOUNTER — OFFICE VISIT (OUTPATIENT)
Dept: FAMILY MEDICINE CLINIC | Facility: CLINIC | Age: 59
End: 2024-01-26
Payer: COMMERCIAL

## 2024-01-26 VITALS
WEIGHT: 137 LBS | RESPIRATION RATE: 16 BRPM | BODY MASS INDEX: 25.21 KG/M2 | HEIGHT: 62 IN | SYSTOLIC BLOOD PRESSURE: 132 MMHG | DIASTOLIC BLOOD PRESSURE: 80 MMHG | OXYGEN SATURATION: 99 % | TEMPERATURE: 97.3 F | HEART RATE: 61 BPM

## 2024-01-26 DIAGNOSIS — E78.2 MIXED HYPERLIPIDEMIA: ICD-10-CM

## 2024-01-26 DIAGNOSIS — Z00.00 ROUTINE ADULT HEALTH MAINTENANCE: ICD-10-CM

## 2024-01-26 DIAGNOSIS — Z00.00 ROUTINE ADULT HEALTH MAINTENANCE: Primary | ICD-10-CM

## 2024-01-26 LAB
ALBUMIN SERPL BCP-MCNC: 4.4 G/DL (ref 3.5–5)
ALP SERPL-CCNC: 65 U/L (ref 34–104)
ALT SERPL W P-5'-P-CCNC: 31 U/L (ref 7–52)
ANION GAP SERPL CALCULATED.3IONS-SCNC: 9 MMOL/L
AST SERPL W P-5'-P-CCNC: 29 U/L (ref 13–39)
BACTERIA UR QL AUTO: ABNORMAL /HPF
BASOPHILS # BLD AUTO: 0.02 THOUSANDS/ÂΜL (ref 0–0.1)
BASOPHILS NFR BLD AUTO: 0 % (ref 0–1)
BILIRUB SERPL-MCNC: 1.02 MG/DL (ref 0.2–1)
BILIRUB UR QL STRIP: NEGATIVE
BUN SERPL-MCNC: 16 MG/DL (ref 5–25)
CALCIUM SERPL-MCNC: 10 MG/DL (ref 8.4–10.2)
CHLORIDE SERPL-SCNC: 104 MMOL/L (ref 96–108)
CHOLEST SERPL-MCNC: 217 MG/DL
CLARITY UR: CLEAR
CO2 SERPL-SCNC: 26 MMOL/L (ref 21–32)
COLOR UR: ABNORMAL
CREAT SERPL-MCNC: 0.82 MG/DL (ref 0.6–1.3)
EOSINOPHIL # BLD AUTO: 0.06 THOUSAND/ÂΜL (ref 0–0.61)
EOSINOPHIL NFR BLD AUTO: 1 % (ref 0–6)
ERYTHROCYTE [DISTWIDTH] IN BLOOD BY AUTOMATED COUNT: 14.4 % (ref 11.6–15.1)
GFR SERPL CREATININE-BSD FRML MDRD: 79 ML/MIN/1.73SQ M
GLUCOSE P FAST SERPL-MCNC: 89 MG/DL (ref 65–99)
GLUCOSE UR STRIP-MCNC: NEGATIVE MG/DL
HCT VFR BLD AUTO: 45.4 % (ref 34.8–46.1)
HDLC SERPL-MCNC: 65 MG/DL
HGB BLD-MCNC: 14.2 G/DL (ref 11.5–15.4)
HGB UR QL STRIP.AUTO: NEGATIVE
IMM GRANULOCYTES # BLD AUTO: 0.01 THOUSAND/UL (ref 0–0.2)
IMM GRANULOCYTES NFR BLD AUTO: 0 % (ref 0–2)
KETONES UR STRIP-MCNC: NEGATIVE MG/DL
LDLC SERPL CALC-MCNC: 112 MG/DL (ref 0–100)
LEUKOCYTE ESTERASE UR QL STRIP: NEGATIVE
LYMPHOCYTES # BLD AUTO: 2.49 THOUSANDS/ÂΜL (ref 0.6–4.47)
LYMPHOCYTES NFR BLD AUTO: 48 % (ref 14–44)
MCH RBC QN AUTO: 29.5 PG (ref 26.8–34.3)
MCHC RBC AUTO-ENTMCNC: 31.3 G/DL (ref 31.4–37.4)
MCV RBC AUTO: 94 FL (ref 82–98)
MONOCYTES # BLD AUTO: 0.39 THOUSAND/ÂΜL (ref 0.17–1.22)
MONOCYTES NFR BLD AUTO: 7 % (ref 4–12)
MUCOUS THREADS UR QL AUTO: ABNORMAL
NEUTROPHILS # BLD AUTO: 2.37 THOUSANDS/ÂΜL (ref 1.85–7.62)
NEUTS SEG NFR BLD AUTO: 44 % (ref 43–75)
NITRITE UR QL STRIP: NEGATIVE
NON-SQ EPI CELLS URNS QL MICRO: ABNORMAL /HPF
NONHDLC SERPL-MCNC: 152 MG/DL
NRBC BLD AUTO-RTO: 0 /100 WBCS
PH UR STRIP.AUTO: 7 [PH]
PLATELET # BLD AUTO: 304 THOUSANDS/UL (ref 149–390)
PMV BLD AUTO: 11.7 FL (ref 8.9–12.7)
POTASSIUM SERPL-SCNC: 4.4 MMOL/L (ref 3.5–5.3)
PROT SERPL-MCNC: 7.7 G/DL (ref 6.4–8.4)
PROT UR STRIP-MCNC: ABNORMAL MG/DL
RBC # BLD AUTO: 4.82 MILLION/UL (ref 3.81–5.12)
RBC #/AREA URNS AUTO: ABNORMAL /HPF
SODIUM SERPL-SCNC: 139 MMOL/L (ref 135–147)
SP GR UR STRIP.AUTO: 1.02 (ref 1–1.03)
TRIGL SERPL-MCNC: 202 MG/DL
TSH SERPL DL<=0.05 MIU/L-ACNC: 1.33 UIU/ML (ref 0.45–4.5)
UROBILINOGEN UR STRIP-ACNC: <2 MG/DL
WBC # BLD AUTO: 5.34 THOUSAND/UL (ref 4.31–10.16)
WBC #/AREA URNS AUTO: ABNORMAL /HPF

## 2024-01-26 PROCEDURE — 99396 PREV VISIT EST AGE 40-64: CPT | Performed by: NURSE PRACTITIONER

## 2024-01-26 PROCEDURE — 85025 COMPLETE CBC W/AUTO DIFF WBC: CPT

## 2024-01-26 PROCEDURE — 80053 COMPREHEN METABOLIC PANEL: CPT

## 2024-01-26 PROCEDURE — 84443 ASSAY THYROID STIM HORMONE: CPT

## 2024-01-26 PROCEDURE — 80061 LIPID PANEL: CPT

## 2024-01-26 PROCEDURE — 36415 COLL VENOUS BLD VENIPUNCTURE: CPT

## 2024-01-26 NOTE — TELEPHONE ENCOUNTER
----- Message from Ivis Chang sent at 1/26/2024 10:08 AM EST -----  Regarding: care gap request CRC  01/26/24 10:08 AM    Hello, our patient attached above has had CRC: Cologuard completed/performed. Please assist in updating the patient chart by pulling the document from Lab Tab within Chart Review. The date of service is 1/25/2023.     Thank you,  Ivis GUO [facility]

## 2024-01-26 NOTE — TELEPHONE ENCOUNTER
Upon review of the In Basket request we  found the hm already up to date with the requested item    Any additional questions or concerns should be emailed to the Practice Liaisons via the appropriate education email address, please do not reply via In Basket.    Thank you  Noah Moreno MA

## 2024-01-26 NOTE — PROGRESS NOTES
"Name: Eveline Costa      : 1965      MRN: [id number]  Encounter Provider: FRANK Ramires  Encounter Date: 2024   Encounter department: [facility]    Assessment & Plan     1. Routine adult health maintenance    2. Mixed hyperlipidemia    Physical assessment unremarkable. Labs reviewed were WNL also VS were well controlled. Labs given is to complete in 6 mos for possible fu discussion. RTO as needed or for next scheduled appt. All questions answered.  Dosing all possible side effects of the prescribed medications or medications that had been prescribed in the past were reviewed and all questions were answered.  Patient verbalized agreement and understanding of the plan of care as outlined during the office visit today return to office as indicated or sooner if a problem arises.         Subjective      HPI  Review of Systems    Current Outpatient Medications on File Prior to Visit   Medication Sig    cycloSPORINE (RESTASIS) 0.05 % ophthalmic emulsion 1 drop 2 (two) times a day    multivitamin (THERAGRAN) TABS Take 1 tablet by mouth daily    Probiotic Product (PROBIOTIC DAILY PO) Take by mouth    rosuvastatin (CRESTOR) 5 mg tablet Take 1 tablet (5 mg total) by mouth daily    TURMERIC PO Take by mouth       Objective     /80 (BP Location: Left arm, Patient Position: Sitting, Cuff Size: Standard)   Pulse 61   Temp (!) 97.3 °F (36.3 °C) (Temporal)   Resp 16   Ht 5' 2\" (1.575 m)   Wt 62.1 kg (137 lb)   LMP 2021   SpO2 99%   BMI 25.06 kg/m²     Physical Exam  FRANK Ramires    "

## 2024-02-21 PROBLEM — Z01.419 ENCOUNTER FOR GYNECOLOGICAL EXAMINATION (GENERAL) (ROUTINE) WITHOUT ABNORMAL FINDINGS: Status: RESOLVED | Noted: 2019-01-02 | Resolved: 2024-02-21

## 2024-04-12 ENCOUNTER — OFFICE VISIT (OUTPATIENT)
Dept: FAMILY MEDICINE CLINIC | Facility: CLINIC | Age: 59
End: 2024-04-12

## 2024-04-12 VITALS
SYSTOLIC BLOOD PRESSURE: 122 MMHG | RESPIRATION RATE: 16 BRPM | HEIGHT: 62 IN | OXYGEN SATURATION: 98 % | BODY MASS INDEX: 25.76 KG/M2 | WEIGHT: 140 LBS | HEART RATE: 68 BPM | DIASTOLIC BLOOD PRESSURE: 70 MMHG | TEMPERATURE: 98.1 F

## 2024-04-12 DIAGNOSIS — H65.01 RIGHT ACUTE SEROUS OTITIS MEDIA, RECURRENCE NOT SPECIFIED: ICD-10-CM

## 2024-04-12 DIAGNOSIS — J06.9 URI WITH COUGH AND CONGESTION: Primary | ICD-10-CM

## 2024-04-12 RX ORDER — PREDNISONE 10 MG/1
10 TABLET ORAL 2 TIMES DAILY WITH MEALS
Qty: 10 TABLET | Refills: 0 | Status: SHIPPED | OUTPATIENT
Start: 2024-04-12 | End: 2024-04-17

## 2024-04-12 RX ORDER — CIPROFLOXACIN 500 MG/1
500 TABLET, FILM COATED ORAL EVERY 12 HOURS SCHEDULED
Qty: 20 TABLET | Refills: 0 | Status: SHIPPED | OUTPATIENT
Start: 2024-04-12 | End: 2024-04-22

## 2024-04-12 NOTE — PROGRESS NOTES
Assessment/Plan:      Diagnoses and all orders for this visit:    URI with cough and congestion  -     predniSONE 10 mg tablet; Take 1 tablet (10 mg total) by mouth 2 (two) times a day with meals for 5 days  -     ciprofloxacin (CIPRO) 500 mg tablet; Take 1 tablet (500 mg total) by mouth every 12 (twelve) hours for 10 days    Right acute serous otitis media, recurrence not specified  -     predniSONE 10 mg tablet; Take 1 tablet (10 mg total) by mouth 2 (two) times a day with meals for 5 days  -     ciprofloxacin (CIPRO) 500 mg tablet; Take 1 tablet (500 mg total) by mouth every 12 (twelve) hours for 10 days        Uncomplicated uri with R serous OM and redness some throat redness as well. Will treat as indicated. Dosing all possible side effects of the prescribed medications or medications that had been prescribed in the past were reviewed and all questions were answered.  Patient verbalized agreement and understanding of the plan of care as outlined during the office visit today return to office as indicated or sooner if a problem arises.    Subjective:     Patient ID: Eveline Costa is a 58 y.o. female.      Patient is here with a complaint of upper respiratory tract discomfort dry scratchy throat and R ear discomfort..  Denies any need nausea vomiting diarrhea chest pains or shortness of breath.  All over-the-counter medication attempted arm were unsuccessful.            Review of Systems   Constitutional:  Negative for chills and fatigue.   HENT:  Positive for ear pain, postnasal drip, rhinorrhea and sore throat. Negative for congestion.    Respiratory:  Positive for cough.          Objective:     Physical Exam  Vitals and nursing note reviewed.   Constitutional:       General: She is not in acute distress.     Appearance: She is well-developed. She is not diaphoretic.   HENT:      Head: Normocephalic and atraumatic.      Left Ear: Tympanic membrane and external ear normal.   Eyes:      Pupils: Pupils are equal,  round, and reactive to light.   Cardiovascular:      Rate and Rhythm: Normal rate and regular rhythm.      Heart sounds: Normal heart sounds.   Pulmonary:      Effort: Pulmonary effort is normal.      Breath sounds: Normal breath sounds.   Abdominal:      Palpations: Abdomen is soft.   Musculoskeletal:         General: Normal range of motion.      Cervical back: Normal range of motion and neck supple.   Skin:     General: Skin is dry.   Neurological:      Mental Status: She is alert and oriented to person, place, and time.   Psychiatric:         Behavior: Behavior normal.         Thought Content: Thought content normal.

## 2024-05-07 ENCOUNTER — HOSPITAL ENCOUNTER (OUTPATIENT)
Dept: RADIOLOGY | Facility: MEDICAL CENTER | Age: 59
Discharge: HOME | End: 2024-05-07
Payer: COMMERCIAL

## 2024-05-07 VITALS — WEIGHT: 140 LBS | HEIGHT: 62 IN | BODY MASS INDEX: 25.76 KG/M2

## 2024-05-07 DIAGNOSIS — Z12.31 ENCOUNTER FOR SCREENING MAMMOGRAM FOR MALIGNANT NEOPLASM OF BREAST: ICD-10-CM

## 2024-05-07 PROCEDURE — 77067 SCR MAMMO BI INCL CAD: CPT

## 2024-05-07 PROCEDURE — 77063 BREAST TOMOSYNTHESIS BI: CPT

## 2024-06-05 ENCOUNTER — ANNUAL EXAM (OUTPATIENT)
Dept: OBGYN CLINIC | Facility: MEDICAL CENTER | Age: 59
End: 2024-06-05
Payer: COMMERCIAL

## 2024-06-05 VITALS
HEIGHT: 62 IN | WEIGHT: 137 LBS | SYSTOLIC BLOOD PRESSURE: 116 MMHG | DIASTOLIC BLOOD PRESSURE: 64 MMHG | BODY MASS INDEX: 25.21 KG/M2

## 2024-06-05 DIAGNOSIS — Z12.39 ENCOUNTER FOR OTHER SCREENING FOR MALIGNANT NEOPLASM OF BREAST: Primary | ICD-10-CM

## 2024-06-05 DIAGNOSIS — N94.10 DYSPAREUNIA IN FEMALE: ICD-10-CM

## 2024-06-05 DIAGNOSIS — Z01.419 ENCOUNTER FOR GYNECOLOGICAL EXAMINATION (GENERAL) (ROUTINE) WITHOUT ABNORMAL FINDINGS: ICD-10-CM

## 2024-06-05 DIAGNOSIS — N93.0 POSTCOITAL BLEEDING: ICD-10-CM

## 2024-06-05 PROCEDURE — S0612 ANNUAL GYNECOLOGICAL EXAMINA: HCPCS | Performed by: CLINICAL NURSE SPECIALIST

## 2024-06-05 NOTE — ASSESSMENT & PLAN NOTE
Pt with several years of intermittent pain with sex.  Both vaginal and pelvic  Exam with taught band of tissue on Right side  Will verify no abnormal anatomy with pelvic US, but suspect PFD and will likely recommend Pelvic PT- discussed plan with pt.

## 2024-06-05 NOTE — PROGRESS NOTES
Subjective:      Eveline Costa is a 58 y.o. female. Here for Gynecologic Exam (Postmenopausal /Pap 6/2/23 -/-/Mammo 5/7/24 bi-rads 2 /Cologuard 1/25/23 neg /)      GYN HPI  Menstrual cycle:   no menses since last visit but is having PCB  Hx of spotting last year- FSH/Estrad in menopausal range. Did not do US as ordered.   Vaginal c/o: denies  Urinary c/o: deies  Breast complaints:Denies   She does do self breast Exams    Sexually active: yes, same male partner  Is Having some pelvic discomfort w/ sex. More with  deeper penetration  Contraception: n/a  She reports she feels safe at home.     Dietary calcium/vit D  intake: Moderate intake and is on supplements  Lifestyle: Regularly exercises    HEALTH MAINTENANCE SCREENINGS:    Last Papanicolaou test:  06/02/2023   History of abnormal pap: No  Last mammogram:  05/07/2024  Last Colon Cancer Screening: colonoscopy: 12/05/2017 Cologuard:01/25/2023. Recall 2025    Hereditary Cancer Screening  Cancer-related family history includes Prostate cancer (age of onset: 68) in her maternal uncle. There is no history of Breast cancer, Colon cancer, Ovarian cancer, Uterine cancer, or Endometrial cancer.     Substance Abuse Screening Completed. See hx and flowsheet.    The following portions of the patient's history were reviewed and updated as appropriate: allergies, current medications, past family history, past medical history, past social history, past surgical history, and problem list.  Review of Systems   Constitutional:  Negative for appetite change, chills, fatigue, fever and unexpected weight change.   HENT: Negative.     Eyes: Negative.    Respiratory:  Negative for chest tightness and shortness of breath.    Cardiovascular:  Negative for chest pain and palpitations.   Gastrointestinal:  Negative for abdominal pain, constipation and vomiting.   Endocrine: Negative for cold intolerance and heat intolerance.   Genitourinary:         As per HPI   Musculoskeletal:  Negative  "for back pain, joint swelling and neck pain.   Skin:  Negative for color change and rash.   Neurological:  Negative for dizziness, weakness and numbness.   Hematological:  Does not bruise/bleed easily.   Psychiatric/Behavioral: Negative.               Objective:  /64 (BP Location: Left arm, Patient Position: Sitting, Cuff Size: Standard)   Ht 5' 2\" (1.575 m)   Wt 62.1 kg (137 lb)   LMP 01/18/2021   BMI 25.06 kg/m²        Physical Exam  Constitutional:       General: She is not in acute distress.     Appearance: Normal appearance.   Genitourinary:      Vulva and rectum normal.      No lesions in the vagina.      Right Labia: No rash or lesions.     Left Labia: No lesions or rash.     Vaginal tenderness (right side only) present.      No vaginal discharge, erythema or bleeding.      No vaginal prolapse present.     Mild vaginal atrophy present.     Vaginal exam comments: Taught band of tissue of right side of vagina .        Right Adnexa: tender (mild rt adnexal TTP on exam).     Right Adnexa: no mass present.     Left Adnexa: not tender and no mass present.     No cervical motion tenderness, discharge or friability.      Uterus is not enlarged or tender.      No urethral prolapse present.      Pelvic exam was performed with patient in the lithotomy position.   Breasts:     Breasts are symmetrical.      Right: No inverted nipple, mass, nipple discharge, skin change or tenderness.      Left: No inverted nipple, mass, nipple discharge, skin change or tenderness.   HENT:      Head: Normocephalic and atraumatic.   Cardiovascular:      Rate and Rhythm: Normal rate.      Heart sounds: No murmur heard.  Pulmonary:      Effort: Pulmonary effort is normal.      Breath sounds: Normal breath sounds.   Abdominal:      General: There is no distension.      Palpations: Abdomen is soft.      Tenderness: There is no abdominal tenderness.   Musculoskeletal:         General: Normal range of motion.      Cervical back: Normal " range of motion.   Lymphadenopathy:      Cervical: No cervical adenopathy.   Neurological:      Mental Status: She is alert and oriented to person, place, and time.   Skin:     General: Skin is warm and dry.   Psychiatric:         Mood and Affect: Mood normal.         Behavior: Behavior normal.   Vitals reviewed.             Assessment/Plan:           ANNUAL GYN EXAM- Primary  Annual GYN examination completed today.   Health maintenance reviewed/updated as appropriate  Cervical cancer screen: Previous pap smears and ASCCP screening guidelines have been reviewed. Pap due in 2028.  Breast Health: Encouraged regular self breast exams. Mammo ordered.  Colon cancer screening: up to date till 2027    Risk prevention and anticipatory guidance provided including:  Age related Calcium and vitamin D intake  Dietary and lifestyle recommendations based on her age and weight. body mass index is 25.06 kg/m²..    Tobacco and alcohol use, intervention ordered if applicable.   Condom use for prevention of STI's.  Contraception:  N/A- post menopause    Problem List Items Addressed This Visit       Dyspareunia in female     Pt with several years of intermittent pain with sex.  Both vaginal and pelvic  Exam with taught band of tissue on Right side  Will verify no abnormal anatomy with pelvic US, but suspect PFD and will likely recommend Pelvic PT- discussed plan with pt.           Relevant Orders    US pelvis complete w transvaginal    Postcoital bleeding     Last full period 2/2022.  Pt having PCB still. US was previously ordered but not completed. Re-ordered; Encouraged to Complete   EMB if ET thick/irregular         Relevant Orders    US pelvis complete w transvaginal     Other Visit Diagnoses       Encounter for other screening for malignant neoplasm of breast    -  Primary    Relevant Orders    Mammo screening bilateral w 3d & cad    Encounter for gynecological examination (general) (routine) without abnormal findings                 Orders Placed This Encounter   Procedures    Mammo screening bilateral w 3d & cad    US pelvis complete w transvaginal

## 2024-06-05 NOTE — ASSESSMENT & PLAN NOTE
Last full period 2/2022.  Pt having PCB still. US was previously ordered but not completed. Re-ordered; Encouraged to Complete   EMB if ET thick/irregular

## 2024-07-09 ENCOUNTER — HOSPITAL ENCOUNTER (OUTPATIENT)
Dept: RADIOLOGY | Facility: MEDICAL CENTER | Age: 59
Discharge: HOME | End: 2024-07-09
Payer: COMMERCIAL

## 2024-07-09 DIAGNOSIS — N93.0 POSTCOITAL BLEEDING: ICD-10-CM

## 2024-07-09 DIAGNOSIS — N94.10 DYSPAREUNIA IN FEMALE: ICD-10-CM

## 2024-07-09 PROCEDURE — 76856 US EXAM PELVIC COMPLETE: CPT

## 2024-07-09 PROCEDURE — 76830 TRANSVAGINAL US NON-OB: CPT

## 2024-07-12 NOTE — RESULT ENCOUNTER NOTE
US is mostly normal.  Endometrium not well visualized per report which is what I wanted to assess primarily due to bldg after sex.  When I view the images- I do see slight fluid in lower uterine segment.  I do recommend she have sampling of the lining of the uterus to r/o malignancy.   Can be done with EMB in office- typically very crampy procedure.   Alternative to have short surgical procedure- D&C hysteroscopy     Please let her know  EMB can be done by any provider.  If D&C preferred then needs physician appt

## 2024-07-16 ENCOUNTER — TELEPHONE (OUTPATIENT)
Dept: OBGYN CLINIC | Facility: CLINIC | Age: 59
End: 2024-07-16

## 2024-07-16 NOTE — TELEPHONE ENCOUNTER
----- Message from FRANK Domínguez sent at 7/12/2024  5:12 PM EDT -----  US is mostly normal.  Endometrium not well visualized per report which is what I wanted to assess primarily due to bldg after sex.  When I view the images- I do see slight fluid in lower uterine segment.  I do recommend she have sampling of the lining of the uterus to r/o malignancy.   Can be done with EMB in office- typically very crampy procedure.   Alternative to have short surgical procedure- D&C hysteroscopy     Please let her know  EMB can be done by any provider.  If D&C preferred then needs physician appt

## 2024-07-16 NOTE — TELEPHONE ENCOUNTER
Called and spoke with patient regarding results. Pt feels more comfortable having EMB as she has had it before and although it was painful, she will tolerate that better than surgical procedure as she is very sensitive to anesthesia. Pt states she will be away for her job until the end of August 20th. Would prefer she come after she returns home from her work trip if provider is agreeable with that plan. Advised I will send message back to Shelly and clerical staff to find appointment for EMB.

## 2024-07-26 ENCOUNTER — OFFICE VISIT (OUTPATIENT)
Dept: FAMILY MEDICINE CLINIC | Facility: CLINIC | Age: 59
End: 2024-07-26
Payer: COMMERCIAL

## 2024-07-26 ENCOUNTER — APPOINTMENT (OUTPATIENT)
Dept: LAB | Facility: MEDICAL CENTER | Age: 59
End: 2024-07-26
Payer: COMMERCIAL

## 2024-07-26 VITALS
HEART RATE: 58 BPM | HEIGHT: 62 IN | TEMPERATURE: 97.8 F | OXYGEN SATURATION: 98 % | SYSTOLIC BLOOD PRESSURE: 110 MMHG | BODY MASS INDEX: 25.03 KG/M2 | DIASTOLIC BLOOD PRESSURE: 78 MMHG | WEIGHT: 136 LBS

## 2024-07-26 DIAGNOSIS — E78.2 MIXED HYPERLIPIDEMIA: ICD-10-CM

## 2024-07-26 DIAGNOSIS — E78.2 MIXED HYPERLIPIDEMIA: Primary | ICD-10-CM

## 2024-07-26 LAB
ALBUMIN SERPL BCG-MCNC: 4.4 G/DL (ref 3.5–5)
ALP SERPL-CCNC: 63 U/L (ref 34–104)
ALT SERPL W P-5'-P-CCNC: 18 U/L (ref 7–52)
ANION GAP SERPL CALCULATED.3IONS-SCNC: 12 MMOL/L (ref 4–13)
AST SERPL W P-5'-P-CCNC: 21 U/L (ref 13–39)
BACTERIA UR QL AUTO: ABNORMAL /HPF
BASOPHILS # BLD AUTO: 0.02 THOUSANDS/ÂΜL (ref 0–0.1)
BASOPHILS NFR BLD AUTO: 0 % (ref 0–1)
BILIRUB SERPL-MCNC: 1.11 MG/DL (ref 0.2–1)
BILIRUB UR QL STRIP: NEGATIVE
BUN SERPL-MCNC: 19 MG/DL (ref 5–25)
CALCIUM SERPL-MCNC: 10.1 MG/DL (ref 8.4–10.2)
CHLORIDE SERPL-SCNC: 104 MMOL/L (ref 96–108)
CHOLEST SERPL-MCNC: 216 MG/DL
CLARITY UR: CLEAR
CO2 SERPL-SCNC: 25 MMOL/L (ref 21–32)
COLOR UR: ABNORMAL
CREAT SERPL-MCNC: 0.73 MG/DL (ref 0.6–1.3)
EOSINOPHIL # BLD AUTO: 0.06 THOUSAND/ÂΜL (ref 0–0.61)
EOSINOPHIL NFR BLD AUTO: 1 % (ref 0–6)
ERYTHROCYTE [DISTWIDTH] IN BLOOD BY AUTOMATED COUNT: 14.3 % (ref 11.6–15.1)
GFR SERPL CREATININE-BSD FRML MDRD: 91 ML/MIN/1.73SQ M
GLUCOSE P FAST SERPL-MCNC: 92 MG/DL (ref 65–99)
GLUCOSE UR STRIP-MCNC: NEGATIVE MG/DL
HCT VFR BLD AUTO: 44.3 % (ref 34.8–46.1)
HDLC SERPL-MCNC: 65 MG/DL
HGB BLD-MCNC: 14.2 G/DL (ref 11.5–15.4)
HGB UR QL STRIP.AUTO: NEGATIVE
IMM GRANULOCYTES # BLD AUTO: 0.01 THOUSAND/UL (ref 0–0.2)
IMM GRANULOCYTES NFR BLD AUTO: 0 % (ref 0–2)
KETONES UR STRIP-MCNC: NEGATIVE MG/DL
LDLC SERPL CALC-MCNC: 123 MG/DL (ref 0–100)
LEUKOCYTE ESTERASE UR QL STRIP: NEGATIVE
LYMPHOCYTES # BLD AUTO: 2.5 THOUSANDS/ÂΜL (ref 0.6–4.47)
LYMPHOCYTES NFR BLD AUTO: 47 % (ref 14–44)
MCH RBC QN AUTO: 30.2 PG (ref 26.8–34.3)
MCHC RBC AUTO-ENTMCNC: 32.1 G/DL (ref 31.4–37.4)
MCV RBC AUTO: 94 FL (ref 82–98)
MONOCYTES # BLD AUTO: 0.52 THOUSAND/ÂΜL (ref 0.17–1.22)
MONOCYTES NFR BLD AUTO: 10 % (ref 4–12)
MUCOUS THREADS UR QL AUTO: ABNORMAL
NEUTROPHILS # BLD AUTO: 2.24 THOUSANDS/ÂΜL (ref 1.85–7.62)
NEUTS SEG NFR BLD AUTO: 42 % (ref 43–75)
NITRITE UR QL STRIP: NEGATIVE
NON-SQ EPI CELLS URNS QL MICRO: ABNORMAL /HPF
NONHDLC SERPL-MCNC: 151 MG/DL
NRBC BLD AUTO-RTO: 0 /100 WBCS
PH UR STRIP.AUTO: 7.5 [PH]
PLATELET # BLD AUTO: 309 THOUSANDS/UL (ref 149–390)
PMV BLD AUTO: 11.2 FL (ref 8.9–12.7)
POTASSIUM SERPL-SCNC: 4.5 MMOL/L (ref 3.5–5.3)
PROT SERPL-MCNC: 7.8 G/DL (ref 6.4–8.4)
PROT UR STRIP-MCNC: ABNORMAL MG/DL
RBC # BLD AUTO: 4.7 MILLION/UL (ref 3.81–5.12)
RBC #/AREA URNS AUTO: ABNORMAL /HPF
SODIUM SERPL-SCNC: 141 MMOL/L (ref 135–147)
SP GR UR STRIP.AUTO: 1.02 (ref 1–1.03)
TRIGL SERPL-MCNC: 138 MG/DL
TSH SERPL DL<=0.05 MIU/L-ACNC: 1.02 UIU/ML (ref 0.45–4.5)
UROBILINOGEN UR STRIP-ACNC: <2 MG/DL
WBC # BLD AUTO: 5.35 THOUSAND/UL (ref 4.31–10.16)
WBC #/AREA URNS AUTO: ABNORMAL /HPF

## 2024-07-26 PROCEDURE — 85025 COMPLETE CBC W/AUTO DIFF WBC: CPT

## 2024-07-26 PROCEDURE — 99213 OFFICE O/P EST LOW 20 MIN: CPT | Performed by: NURSE PRACTITIONER

## 2024-07-26 PROCEDURE — 80053 COMPREHEN METABOLIC PANEL: CPT

## 2024-07-26 PROCEDURE — 84443 ASSAY THYROID STIM HORMONE: CPT

## 2024-07-26 PROCEDURE — 36415 COLL VENOUS BLD VENIPUNCTURE: CPT

## 2024-07-26 PROCEDURE — 80061 LIPID PANEL: CPT

## 2024-07-26 PROCEDURE — 81001 URINALYSIS AUTO W/SCOPE: CPT

## 2024-07-26 NOTE — PROGRESS NOTES
Assessment/Plan:      Diagnoses and all orders for this visit:    Mixed hyperlipidemia  -     CBC and differential; Future  -     Comprehensive metabolic panel; Future  -     Lipid panel; Future  -     TSH, 3rd generation; Future  -     UA w Reflex to Microscopic w Reflex to Culture; Future        Physical assessment unremarkable. VS were well controlled. Labs given is to complete for possible fu discussion. If wnl will refill crestor at current dose. RTO as needed or for next scheduled appt. All questions answered.    Subjective:     Patient ID: Eveline Costa is a 58 y.o. female.      Patient is here for routine follow-up.  To review most recent labs.  To also discuss current state of health and any new problems that they may be experiencing.  Patient states that medications taken as prescribed and very well tolerated no new complaints at this time.            Review of Systems   Constitutional:  Negative for appetite change and fever.   HENT:  Negative for sinus pressure and sore throat.    Eyes:  Negative for pain.   Respiratory:  Negative for shortness of breath.    Cardiovascular:  Negative for chest pain.   Gastrointestinal:  Negative for abdominal pain.   Genitourinary:  Negative for dysuria.   Musculoskeletal:  Negative for arthralgias and myalgias.   Skin:  Negative for color change.   Neurological:  Negative for light-headedness.   Psychiatric/Behavioral:  Negative for behavioral problems.          Objective:     Physical Exam  Vitals and nursing note reviewed.   Constitutional:       General: She is not in acute distress.     Appearance: She is well-developed. She is not diaphoretic.   HENT:      Head: Normocephalic and atraumatic.      Right Ear: External ear normal.      Left Ear: External ear normal.      Mouth/Throat:      Mouth: Oropharynx is clear and moist.      Pharynx: Oropharynx is clear.   Eyes:      Extraocular Movements: EOM normal.      Pupils: Pupils are equal, round, and reactive to light.    Cardiovascular:      Rate and Rhythm: Normal rate and regular rhythm.      Heart sounds: Normal heart sounds.   Pulmonary:      Effort: Pulmonary effort is normal.      Breath sounds: Normal breath sounds.   Abdominal:      Palpations: Abdomen is soft.   Musculoskeletal:         General: Normal range of motion.      Cervical back: Normal range of motion and neck supple.   Skin:     General: Skin is dry.   Neurological:      Mental Status: She is alert and oriented to person, place, and time.   Psychiatric:         Mood and Affect: Mood and affect normal.         Behavior: Behavior normal.         Thought Content: Thought content normal.

## 2024-08-21 DIAGNOSIS — E78.2 MIXED HYPERLIPIDEMIA: ICD-10-CM

## 2024-08-21 NOTE — TELEPHONE ENCOUNTER
Pt called regarding her script for rosuvastatin sent a my chart message that it was sent on 08-07-24

## 2024-08-27 ENCOUNTER — OFFICE VISIT (OUTPATIENT)
Dept: FAMILY MEDICINE CLINIC | Facility: CLINIC | Age: 59
End: 2024-08-27
Payer: COMMERCIAL

## 2024-08-27 VITALS
TEMPERATURE: 97.3 F | WEIGHT: 137 LBS | RESPIRATION RATE: 14 BRPM | SYSTOLIC BLOOD PRESSURE: 110 MMHG | OXYGEN SATURATION: 98 % | BODY MASS INDEX: 25.21 KG/M2 | HEIGHT: 62 IN | HEART RATE: 63 BPM | DIASTOLIC BLOOD PRESSURE: 80 MMHG

## 2024-08-27 DIAGNOSIS — L23.7 POISON IVY DERMATITIS: Primary | ICD-10-CM

## 2024-08-27 PROCEDURE — 99213 OFFICE O/P EST LOW 20 MIN: CPT | Performed by: NURSE PRACTITIONER

## 2024-08-27 RX ORDER — BETAMETHASONE DIPROPIONATE 0.5 MG/G
CREAM TOPICAL DAILY
Qty: 30 G | Refills: 0 | Status: SHIPPED | OUTPATIENT
Start: 2024-08-27

## 2024-08-27 RX ORDER — METHYLPREDNISOLONE SODIUM SUCCINATE 125 MG/2ML
125 INJECTION, POWDER, LYOPHILIZED, FOR SOLUTION INTRAMUSCULAR; INTRAVENOUS ONCE
Status: COMPLETED | OUTPATIENT
Start: 2024-08-27 | End: 2024-08-27

## 2024-08-27 RX ADMIN — METHYLPREDNISOLONE SODIUM SUCCINATE 125 MG: 125 INJECTION, POWDER, LYOPHILIZED, FOR SOLUTION INTRAMUSCULAR; INTRAVENOUS at 11:35

## 2024-08-27 NOTE — PROGRESS NOTES
Assessment/Plan:      Diagnoses and all orders for this visit:    Poison ivy dermatitis  -     methylPREDNISolone sodium succinate (Solu-MEDROL) injection 125 mg  -     betamethasone, augmented, (DIPROLENE-AF) 0.05 % cream; Apply topically daily        Dosing all possible side effects of the prescribed medications or medications that had been prescribed in the past were reviewed and all questions were answered.  Patient verbalized agreement and understanding of the plan of care as outlined during the office visit today return to office as indicated or sooner if a problem arises.    Subjective:     Patient ID: Eveline Costa is a 58 y.o. female.    Has poison on the left side of her face and eye it is spreading.         Review of Systems   Constitutional:  Negative for appetite change and fever.   HENT:  Negative for sinus pressure and sore throat.    Eyes:  Negative for pain.   Respiratory:  Negative for shortness of breath.    Cardiovascular:  Negative for chest pain.   Gastrointestinal:  Negative for abdominal pain.   Genitourinary:  Negative for dysuria.   Musculoskeletal:  Negative for arthralgias and myalgias.   Skin:  Positive for rash. Negative for color change.   Neurological:  Negative for light-headedness.   Psychiatric/Behavioral:  Negative for behavioral problems.          Objective:     Physical Exam  Eyes:     Neck:

## 2024-09-15 DIAGNOSIS — E78.2 MIXED HYPERLIPIDEMIA: ICD-10-CM

## 2024-09-16 RX ORDER — ROSUVASTATIN CALCIUM 5 MG/1
5 TABLET, COATED ORAL DAILY
Qty: 90 TABLET | Refills: 1 | Status: SHIPPED | OUTPATIENT
Start: 2024-09-16

## 2024-12-02 ENCOUNTER — TELEMEDICINE (OUTPATIENT)
Dept: FAMILY MEDICINE CLINIC | Facility: CLINIC | Age: 59
End: 2024-12-02
Payer: COMMERCIAL

## 2024-12-02 DIAGNOSIS — J01.10 SUBACUTE FRONTAL SINUSITIS: Primary | ICD-10-CM

## 2024-12-02 PROCEDURE — 99212 OFFICE O/P EST SF 10 MIN: CPT | Performed by: NURSE PRACTITIONER

## 2024-12-02 RX ORDER — CIPROFLOXACIN 500 MG/1
500 TABLET, FILM COATED ORAL EVERY 12 HOURS SCHEDULED
Qty: 14 TABLET | Refills: 0 | Status: SHIPPED | OUTPATIENT
Start: 2024-12-02 | End: 2024-12-09

## 2024-12-02 NOTE — PROGRESS NOTES
Virtual Brief Visit  Name: Eveline Costa      : 1965      MRN: [id number]  Encounter Provider: FRANK Ramires  Encounter Date: 2024   Encounter department: [facility]    This Visit is being completed by telephone. The Patient is located at Home and in the following state in which I hold an active license [place]    The patient was identified by name and date of birth. Eveline Costa was informed that this is a telemedicine visit and that the visit is being conducted through Telephone.  My office door was closed. No one else was in the room.  She acknowledged consent and understanding of privacy and security of the video platform. The patient has agreed to participate and understands they can discontinue the visit at any time.    Patient is aware this is a billable service.     :  Assessment & Plan  Subacute frontal sinusitis    Orders:    ciprofloxacin (CIPRO) 500 mg tablet; Take 1 tablet (500 mg total) by mouth every 12 (twelve) hours for 7 days    Uncomplicated sinusitis.  Patient being seen today virtually.  Is advised if fails to improve she is to contact the back in the office for follow-up.  Dosing all possible side effects of the prescribed medications or medications that had been prescribed in the past were reviewed and all questions were answered.  Patient verbalized agreement and understanding of the plan of care as outlined during the office visit today return to office as indicated or sooner if a problem arises.      History of Present Illness   HPI    Visit Time  Total Visit Duration: 10 min

## 2024-12-17 ENCOUNTER — OFFICE VISIT (OUTPATIENT)
Dept: FAMILY MEDICINE CLINIC | Facility: CLINIC | Age: 59
End: 2024-12-17
Payer: COMMERCIAL

## 2024-12-17 ENCOUNTER — NURSE TRIAGE (OUTPATIENT)
Age: 59
End: 2024-12-17

## 2024-12-17 VITALS
OXYGEN SATURATION: 98 % | HEIGHT: 62 IN | BODY MASS INDEX: 25.21 KG/M2 | TEMPERATURE: 97.5 F | RESPIRATION RATE: 16 BRPM | HEART RATE: 60 BPM | SYSTOLIC BLOOD PRESSURE: 130 MMHG | DIASTOLIC BLOOD PRESSURE: 70 MMHG | WEIGHT: 137 LBS

## 2024-12-17 DIAGNOSIS — R05.9 COUGH, UNSPECIFIED TYPE: Primary | ICD-10-CM

## 2024-12-17 PROCEDURE — 99213 OFFICE O/P EST LOW 20 MIN: CPT | Performed by: NURSE PRACTITIONER

## 2024-12-17 RX ORDER — BROMPHENIRAMINE MALEATE, PSEUDOEPHEDRINE HYDROCHLORIDE, AND DEXTROMETHORPHAN HYDROBROMIDE 2; 30; 10 MG/5ML; MG/5ML; MG/5ML
5 SYRUP ORAL 4 TIMES DAILY PRN
Qty: 120 ML | Refills: 0 | Status: SHIPPED | OUTPATIENT
Start: 2024-12-17

## 2024-12-17 RX ORDER — AZITHROMYCIN 250 MG/1
TABLET, FILM COATED ORAL
Qty: 6 TABLET | Refills: 0 | Status: SHIPPED | OUTPATIENT
Start: 2024-12-17 | End: 2024-12-22

## 2024-12-17 NOTE — PROGRESS NOTES
Assessment/Plan:      Diagnoses and all orders for this visit:    Cough, unspecified type  -     XR chest pa and lateral; Future  -     brompheniramine-pseudoephedrine-DM 30-2-10 MG/5ML syrup; Take 5 mL by mouth 4 (four) times a day as needed for congestion or cough  -     azithromycin (Zithromax) 250 mg tablet; Take 2 tablets (500 mg total) by mouth daily for 1 day, THEN 1 tablet (250 mg total) daily for 4 days.        Uncomplicated cough. If fails to resolve can proceed with imaging. Dosing all possible side effects of the prescribed medications or medications that had been prescribed in the past were reviewed and all questions were answered.  Patient verbalized agreement and understanding of the plan of care as outlined during the office visit today return to office as indicated or sooner if a problem arises.    Subjective:     Patient ID: Eveline Costa is a 58 y.o. female.      Patient is here with a complaint of upper respiratory tract discomfort has had a cough runny nose and some nasal congestion.  Denies any need nausea vomiting diarrhea chest pains or shortness of breath.  All over-the-counter medication attempted arm were unsuccessful.        Cough        Review of Systems   Respiratory:  Positive for cough.          Objective:     Physical Exam  Constitutional:       General: She is not in acute distress.     Appearance: She is not diaphoretic.   HENT:      Head: Atraumatic.      Nose: Mucosal edema, congestion and rhinorrhea present.      Right Sinus: Frontal sinus tenderness present.      Left Sinus: Frontal sinus tenderness present.   Cardiovascular:      Rate and Rhythm: Normal rate and regular rhythm.      Heart sounds: Normal heart sounds.   Pulmonary:      Effort: Pulmonary effort is normal.      Breath sounds: Normal breath sounds.   Musculoskeletal:         General: Normal range of motion.      Cervical back: Normal range of motion.

## 2024-12-17 NOTE — TELEPHONE ENCOUNTER
"Patient called in with concerns over lingering cough with SOB and wheezing at times from cough. Had telehealth visit 12/2 with Davina Dantehussain MARIE and was prescribed ciprofloxacin (CIPRO) 500 mg tablet  for sinusitis. States since she completed abx congestion has cleared. Patient reports lingering cough that leaves her gasping for breath at times, worse at night. States SOB is worse on exertion and at times cough leaves her wheezing. Able to hold conversation on phone easily. Same day OV made for 1415 today with provider .        Reason for Disposition   Continuous (nonstop) coughing interferes with work or school and no improvement using cough treatment per Care Advice    Answer Assessment - Initial Assessment Questions  1. ONSET: \"When did the cough begin?\"       Had telehealth apt 12/2 and was prescribed Cipro which helped congestion. Persistent cough that leaves patient SOB.  2. SEVERITY: \"How bad is the cough today?\"       Moderate to severe coughing spells, chest hurts when coughing,   3. SPUTUM: \"Describe the color of your sputum\" (e.g., none, dry cough; clear, white, yellow, green)      Denies   4. HEMOPTYSIS: \"Are you coughing up any blood?\" If Yes, ask: \"How much?\" (e.g., flecks, streaks, tablespoons, etc.)      Denies   5. DIFFICULTY BREATHING: \"Are you having difficulty breathing?\" If Yes, ask: \"How bad is it?\" (e.g., mild, moderate, severe)       SOB that has persisting, worse on exertion   6. FEVER: \"Do you have a fever?\" If Yes, ask: \"What is your temperature, how was it measured, and when did it start?\"      Denies   7. CARDIAC HISTORY: \"Do you have any history of heart disease?\" (e.g., heart attack, congestive heart failure)       Hyperlipidemia   8. LUNG HISTORY: \"Do you have any history of lung disease?\"  (e.g., pulmonary embolus, asthma, emphysema)      Denies   9. PE RISK FACTORS: \"Do you have a history of blood clots?\" (or: recent major surgery, recent prolonged travel, bedridden)      Denies " "  10. OTHER SYMPTOMS: \"Do you have any other symptoms?\" (e.g., runny nose, wheezing, chest pain)        Chest discomfort from coughing, does report wheezing at times from coughing spells   11. PREGNANCY: \"Is there any chance you are pregnant?\" \"When was your last menstrual period?\"        Denies   12. TRAVEL: \"Have you traveled out of the country in the last month?\" (e.g., travel history, exposures)      Denies    Protocols used: Cough-Adult-[place]    "

## 2025-03-13 DIAGNOSIS — E78.2 MIXED HYPERLIPIDEMIA: ICD-10-CM

## 2025-03-13 RX ORDER — ROSUVASTATIN CALCIUM 5 MG/1
5 TABLET, COATED ORAL DAILY
Qty: 90 TABLET | Refills: 1 | Status: SHIPPED | OUTPATIENT
Start: 2025-03-13

## 2025-04-04 ENCOUNTER — OFFICE VISIT (OUTPATIENT)
Dept: FAMILY MEDICINE CLINIC | Facility: CLINIC | Age: 60
End: 2025-04-04
Payer: COMMERCIAL

## 2025-04-04 ENCOUNTER — APPOINTMENT (OUTPATIENT)
Dept: LAB | Facility: MEDICAL CENTER | Age: 60
End: 2025-04-04
Payer: COMMERCIAL

## 2025-04-04 VITALS
DIASTOLIC BLOOD PRESSURE: 90 MMHG | RESPIRATION RATE: 16 BRPM | TEMPERATURE: 97.2 F | WEIGHT: 140 LBS | HEIGHT: 62 IN | SYSTOLIC BLOOD PRESSURE: 126 MMHG | OXYGEN SATURATION: 99 % | BODY MASS INDEX: 25.76 KG/M2 | HEART RATE: 64 BPM

## 2025-04-04 DIAGNOSIS — Z00.00 ROUTINE ADULT HEALTH MAINTENANCE: ICD-10-CM

## 2025-04-04 DIAGNOSIS — G44.029 CHRONIC CLUSTER HEADACHE, NOT INTRACTABLE: ICD-10-CM

## 2025-04-04 DIAGNOSIS — E78.00 ELEVATED CHOLESTEROL: ICD-10-CM

## 2025-04-04 DIAGNOSIS — Z00.00 ROUTINE ADULT HEALTH MAINTENANCE: Primary | ICD-10-CM

## 2025-04-04 LAB
ALBUMIN SERPL BCG-MCNC: 4.5 G/DL (ref 3.5–5)
ALP SERPL-CCNC: 66 U/L (ref 34–104)
ALT SERPL W P-5'-P-CCNC: 23 U/L (ref 7–52)
ANION GAP SERPL CALCULATED.3IONS-SCNC: 11 MMOL/L (ref 4–13)
AST SERPL W P-5'-P-CCNC: 25 U/L (ref 13–39)
BACTERIA UR QL AUTO: ABNORMAL /HPF
BASOPHILS # BLD AUTO: 0.04 THOUSANDS/ÂΜL (ref 0–0.1)
BASOPHILS NFR BLD AUTO: 1 % (ref 0–1)
BILIRUB SERPL-MCNC: 0.96 MG/DL (ref 0.2–1)
BILIRUB UR QL STRIP: NEGATIVE
BUN SERPL-MCNC: 18 MG/DL (ref 5–25)
CALCIUM SERPL-MCNC: 10 MG/DL (ref 8.4–10.2)
CHLORIDE SERPL-SCNC: 106 MMOL/L (ref 96–108)
CHOLEST SERPL-MCNC: 220 MG/DL (ref ?–200)
CLARITY UR: CLEAR
CO2 SERPL-SCNC: 24 MMOL/L (ref 21–32)
COLOR UR: ABNORMAL
CREAT SERPL-MCNC: 0.75 MG/DL (ref 0.6–1.3)
EOSINOPHIL # BLD AUTO: 0.08 THOUSAND/ÂΜL (ref 0–0.61)
EOSINOPHIL NFR BLD AUTO: 2 % (ref 0–6)
ERYTHROCYTE [DISTWIDTH] IN BLOOD BY AUTOMATED COUNT: 14.2 % (ref 11.6–15.1)
GFR SERPL CREATININE-BSD FRML MDRD: 88 ML/MIN/1.73SQ M
GLUCOSE P FAST SERPL-MCNC: 94 MG/DL (ref 65–99)
GLUCOSE UR STRIP-MCNC: NEGATIVE MG/DL
HCT VFR BLD AUTO: 44.9 % (ref 34.8–46.1)
HDLC SERPL-MCNC: 62 MG/DL
HGB BLD-MCNC: 14.5 G/DL (ref 11.5–15.4)
HGB UR QL STRIP.AUTO: ABNORMAL
HYALINE CASTS #/AREA URNS LPF: ABNORMAL /LPF
IMM GRANULOCYTES # BLD AUTO: 0.02 THOUSAND/UL (ref 0–0.2)
IMM GRANULOCYTES NFR BLD AUTO: 0 % (ref 0–2)
KETONES UR STRIP-MCNC: NEGATIVE MG/DL
LDLC SERPL CALC-MCNC: 126 MG/DL (ref 0–100)
LEUKOCYTE ESTERASE UR QL STRIP: NEGATIVE
LYMPHOCYTES # BLD AUTO: 2.32 THOUSANDS/ÂΜL (ref 0.6–4.47)
LYMPHOCYTES NFR BLD AUTO: 42 % (ref 14–44)
MAGNESIUM SERPL-MCNC: 2.4 MG/DL (ref 1.9–2.7)
MCH RBC QN AUTO: 30.3 PG (ref 26.8–34.3)
MCHC RBC AUTO-ENTMCNC: 32.3 G/DL (ref 31.4–37.4)
MCV RBC AUTO: 94 FL (ref 82–98)
MONOCYTES # BLD AUTO: 0.47 THOUSAND/ÂΜL (ref 0.17–1.22)
MONOCYTES NFR BLD AUTO: 9 % (ref 4–12)
MUCOUS THREADS UR QL AUTO: ABNORMAL
NEUTROPHILS # BLD AUTO: 2.55 THOUSANDS/ÂΜL (ref 1.85–7.62)
NEUTS SEG NFR BLD AUTO: 46 % (ref 43–75)
NITRITE UR QL STRIP: NEGATIVE
NON-SQ EPI CELLS URNS QL MICRO: ABNORMAL /HPF
NONHDLC SERPL-MCNC: 158 MG/DL
NRBC BLD AUTO-RTO: 0 /100 WBCS
PH UR STRIP.AUTO: 7 [PH]
PLATELET # BLD AUTO: 301 THOUSANDS/UL (ref 149–390)
PMV BLD AUTO: 11.1 FL (ref 8.9–12.7)
POTASSIUM SERPL-SCNC: 4.5 MMOL/L (ref 3.5–5.3)
PROT SERPL-MCNC: 7.9 G/DL (ref 6.4–8.4)
PROT UR STRIP-MCNC: ABNORMAL MG/DL
RBC # BLD AUTO: 4.78 MILLION/UL (ref 3.81–5.12)
RBC #/AREA URNS AUTO: ABNORMAL /HPF
SODIUM SERPL-SCNC: 141 MMOL/L (ref 135–147)
SP GR UR STRIP.AUTO: 1.02 (ref 1–1.03)
TRIGL SERPL-MCNC: 160 MG/DL (ref ?–150)
UROBILINOGEN UR STRIP-ACNC: <2 MG/DL
WBC # BLD AUTO: 5.48 THOUSAND/UL (ref 4.31–10.16)
WBC #/AREA URNS AUTO: ABNORMAL /HPF

## 2025-04-04 PROCEDURE — 83735 ASSAY OF MAGNESIUM: CPT

## 2025-04-04 PROCEDURE — 36415 COLL VENOUS BLD VENIPUNCTURE: CPT

## 2025-04-04 PROCEDURE — 81001 URINALYSIS AUTO W/SCOPE: CPT

## 2025-04-04 PROCEDURE — 99396 PREV VISIT EST AGE 40-64: CPT | Performed by: NURSE PRACTITIONER

## 2025-04-04 PROCEDURE — 80053 COMPREHEN METABOLIC PANEL: CPT

## 2025-04-04 PROCEDURE — 80061 LIPID PANEL: CPT

## 2025-04-04 PROCEDURE — 85025 COMPLETE CBC W/AUTO DIFF WBC: CPT

## 2025-04-04 NOTE — ASSESSMENT & PLAN NOTE
Problem has resolved I will continue to monitor.       Physical assessment unremarkable.  VS were well controlled. Dosing all possible side effects of the prescribed medications or medications that had been prescribed in the past were reviewed and all questions were answered.  Patient verbalized agreement and understanding of the plan of care as outlined during the office visit today return to office as indicated or sooner if a problem arises.  Labs given is to complete for possible fu discussion. RTO as needed or for next scheduled appt. All questions answered.

## 2025-04-04 NOTE — ASSESSMENT & PLAN NOTE
Historically stable.  I will continue 5 mg rosuvastatin to be taken daily will recheck labs shortly and contact her with the results when available low-cholesterol saturated fat diet was strongly encouraged plan of care.  All questions answered she is happy with the outcome of today's visit

## 2025-04-04 NOTE — PROGRESS NOTES
Name: Eveline Costa      : 1965      MRN: [id number]  Encounter Provider: FRANK Ramires  Encounter Date: 2025   Encounter department: [facility]    Assessment & Plan  Routine adult health maintenance    Orders:    CBC and differential; Future    Comprehensive metabolic panel; Future    Lipid panel; Future    UA w Reflex to Microscopic w Reflex to Culture; Future    Magnesium; Future    Elevated cholesterol  Historically stable.  I will continue 5 mg rosuvastatin to be taken daily will recheck labs shortly and contact her with the results when available low-cholesterol saturated fat diet was strongly encouraged plan of care.  All questions answered she is happy with the outcome of today's visit         Chronic cluster headache, not intractable  Problem has resolved I will continue to monitor.       Physical assessment unremarkable.  VS were well controlled. Dosing all possible side effects of the prescribed medications or medications that had been prescribed in the past were reviewed and all questions were answered.  Patient verbalized agreement and understanding of the plan of care as outlined during the office visit today return to office as indicated or sooner if a problem arises.  Labs given is to complete for possible fu discussion. RTO as needed or for next scheduled appt. All questions answered.         History of Present Illness     HPI  Review of Systems  Past Medical History:   Diagnosis Date    Anemia     iron defficiency    ASCUS with positive high risk HPV cervical     Asthma     last assessed - 2017    Headache     History of colonic polyps     Resolved -     HPV (human papilloma virus) infection     Hyperlipidemia     diet controlled    Kidney stone     Uterine fibroid      Past Surgical History:   Procedure Laterality Date    BONE BIOPSY Left 2018    Procedure: FOOT SOFT TISSUE MASS REMOVAL;  Surgeon: Ankush Lujan DPM;  Location: [facility];  Service: Podiatry    BREAST BIOPSY Left     Percutaneous needle core; benign    COLONOSCOPY      COLPOSCOPY W/ BIOPSY / CURETTAGE  04/28/2015    Colposcopy Cervix with Biopsy(s) - COLPO=neg; last assessed - 28Apr2015    DILATION AND CURETTAGE OF UTERUS      ENDOMETRIAL BIOPSY  04/28/2015    by suction; EB/COLPO = Negative Results; last assessed - 30Apr2015    FOOT SURGERY      LITHOTRIPSY      Renal    MT COLONOSCOPY FLX DX W/COLLJ SPEC WHEN PFRMD N/A 12/05/2017    Procedure: COLONOSCOPY;  Surgeon: Florian Reeder MD;  Location: [facility];  Service: Gastroenterology; Complete Colonoscopy    TUBAL LIGATION       Family History   Problem Relation Age of Onset    Arthritis Mother     Hypertension Mother     Diabetes Father     Hypertension Maternal Grandmother     Hypertension Maternal Grandfather     No Known Problems Paternal Grandmother     No Known Problems Paternal Grandfather     No Known Problems Brother     Hypertension Brother     No Known Problems Brother     No Known Problems Brother     No Known Problems Son     No Known Problems Son     Prostate cancer Maternal Uncle 68    Breast cancer Neg Hx     Colon cancer Neg Hx     Ovarian cancer Neg Hx     Uterine cancer Neg Hx     BRCA2 Positive Neg Hx     BRCA2 Negative Neg Hx     BRCA1 Positive Neg Hx     BRCA1 Negative Neg Hx     BRCA 1/2 Neg Hx     Endometrial cancer Neg Hx     Breast cancer additional onset Neg Hx      Social History     Tobacco Use    Smoking status: Never    Smokeless tobacco: Never   Vaping Use    Vaping status: Never Used   Substance and Sexual Activity    Alcohol use: No    Drug use: No    Sexual activity: Not Currently     Partners: Male     Birth control/protection: Female Sterilization     Current Outpatient Medications on File Prior to Visit   Medication Sig    betamethasone, augmented, (DIPROLENE-AF) 0.05 % cream Apply topically daily    cycloSPORINE (RESTASIS) 0.05 % ophthalmic emulsion 1 drop 2 (two) times a day     "multivitamin (THERAGRAN) TABS Take 1 tablet by mouth daily    Probiotic Product (PROBIOTIC DAILY PO) Take by mouth    rosuvastatin (CRESTOR) 5 mg tablet TAKE 1 TABLET (5 MG TOTAL) BY MOUTH DAILY.    TURMERIC PO Take by mouth    brompheniramine-pseudoephedrine-DM 30-2-10 MG/5ML syrup Take 5 mL by mouth 4 (four) times a day as needed for congestion or cough (Patient not taking: Reported on 4/4/2025)     Allergies   Allergen Reactions    Penicillins Hives     Immunization History   Administered Date(s) Administered    INFLUENZA 09/20/2020    Influenza Injectable, MDCK, Preservative Free, Quadrivalent, 0.5 mL 09/20/2020    Tdap 02/18/2020     Objective   /90 (BP Location: Left arm, Patient Position: Sitting, Cuff Size: Standard)   Pulse 64   Temp (!) 97.2 °F (36.2 °C) (Temporal)   Resp 16   Ht 5' 2\" (1.575 m)   Wt 63.5 kg (140 lb)   LMP 01/18/2021   SpO2 99%   BMI 25.61 kg/m²     Physical Exam    "

## 2025-06-06 NOTE — PROGRESS NOTES
Name: Eveline Costa      : 1965      MRN: [id number]  Encounter Provider: FRANK Waterman  Encounter Date: 2025   Encounter department: [facility] [place]    :  Assessment & Plan  Encounter for gynecological examination (general) (routine) without abnormal findings  Annual GYN examination completed today.   Health maintenance reviewed/updated as appropriate  Risk prevention and anticipatory guidance provided including:  Encouraged regular self breast exams and to call with changes   Age related Calcium and vitamin D intake  Dietary and lifestyle recommendations based on her age and weight. body mass index is 25.06 kg/m²..    Tobacco and alcohol use, intervention ordered if applicable.   Condom use for prevention of STI's.       Vaginal atrophy  See A&P for dyspareunia  Orders:  •  estradiol (ESTRACE) 0.1 mg/g vaginal cream; Insert 1 g into the vagina daily for 14 days, THEN 1 g 2 (two) times a week for 14 days.    Postcoital bleeding  Continues to have occasional spotting with wiping, but only after sex which is not often. US last yr with thin ET, EMB was deferred at that point. Last pap in - Some mild friability on exam. Pap collected. Suspect due to vaginal atrophy-see other A&P       Dyspareunia in female  Continues to c/o symptoms.   Both w/ insertion (vaginal) and deeper penetrative sex. + levator and obturator ttp.   Suspect combination of PFD and Vaginal atrophy. US done last yr for same (and PCB) and stable. + fibroids- which could contribute. Pelvic PT ordered along wit vaginal estrogen for atrophy. Will f/u in 4-6 months        Screening for cervical cancer  Collected due to PCB/cervix friable   Orders:  •  Liquid-based pap, screening                Subjective:      History of Present Illness     Eveline Costa is a 59 y.o. female. Here for Gynecologic Exam (Patient presents for a routine annual visit/Postmenopausal/Last Pap Smear- 24  -/-/Mammogram- 5/7/24 normal/ ref in/Colonoscopy- 2017?/Non smoker/Social drinker/Currently sexually active/No family history of uterine, ovarian, cervical or breast cancer. /No concerns/questions for today's visit )    Was having PCB last year. US done and mostly WNL-known fibroids.. ET not well visualized (not thick) Still occurs but only with sex. Slight amt with wiping.   She denies any C/O abnormal vaginal discharge or irritation.   Denies Urinary complaints  Denies breast changes    Sexually active: yes  Monogamous/single partner  Still having Dyspareunia- both on insertion and deeper pelvic pain.     She reports she feels safe at home.   Lifestyle/exercise: elliptical and yoga 4-5 times per wk  Dietary calcium/vit D  intake: adequate    HEALTH MAINTENANCE SCREENINGS:     Previous pap smears and ASCCP screening guidelines have been reviewed.   Last Pap:  06/02/2023; Next due 2028  History of abnormal pap: No,   Last mammogram:  05/07/2024  Last Colon Cancer Screening: colonoscopy: 12/05/2017 Cologuard:01/25/2023. Recall 10yrs    Hereditary Cancer Screening  FH Breast cancer: no  FH Ovarian cancer: no  FH Uterine cancer: no  FH Colon cancer: no      Substance Abuse Screening Completed. See hx and flowsheet.  Review of Systems   Constitutional:  Negative for appetite change, chills, fatigue, fever and unexpected weight change.   HENT: Negative.     Eyes: Negative.    Respiratory:  Negative for chest tightness and shortness of breath.    Cardiovascular:  Negative for chest pain and palpitations.   Gastrointestinal:  Negative for abdominal pain, constipation and vomiting.   Endocrine: Negative for cold intolerance and heat intolerance.   Genitourinary:         As per HPI   Musculoskeletal:  Negative for back pain, joint swelling and neck pain.   Skin:  Negative for color change and rash.   Neurological:  Negative for dizziness, weakness and numbness.   Hematological:  Does not bruise/bleed easily.  "  Psychiatric/Behavioral: Negative.       The following portions of the patient's history were reviewed and updated as appropriate: allergies, current medications, past family history, past medical history, past social history, past surgical history, and problem list.         Objective   /70 (BP Location: Left arm, Patient Position: Sitting, Cuff Size: Standard)   Ht 5' 2\" (1.575 m)   Wt 62.1 kg (137 lb)   LMP 01/18/2021   BMI 25.06 kg/m²     Physical Exam  Constitutional:       General: She is not in acute distress.     Appearance: Normal appearance.   Genitourinary:      Vulva, bladder and rectum normal.      No lesions in the vagina.      Right Labia: No rash or lesions.     Left Labia: No lesions or rash.     No vaginal discharge, erythema, tenderness or bleeding.      No vaginal prolapse present.     Moderate vaginal atrophy present.       Right Adnexa: not tender and no mass present.     Left Adnexa: not tender and no mass present.     No cervical motion tenderness, discharge or friability.      Uterus is not enlarged or tender.      No urethral prolapse present.      Levator ani is tender and obturator internus is tender.      Pelvic exam was performed with patient in the lithotomy position.   Breasts:     Breasts are symmetrical.      Right: No inverted nipple, mass, nipple discharge, skin change or tenderness.      Left: No inverted nipple, mass, nipple discharge, skin change or tenderness.   HENT:      Head: Normocephalic and atraumatic.     Cardiovascular:      Rate and Rhythm: Normal rate.      Heart sounds: No murmur heard.  Pulmonary:      Effort: Pulmonary effort is normal.      Breath sounds: Normal breath sounds.   Abdominal:      General: There is no distension.      Palpations: Abdomen is soft.      Tenderness: There is no abdominal tenderness.     Musculoskeletal:         General: Normal range of motion.      Cervical back: Normal range of motion.   Lymphadenopathy:      Cervical: No " cervical adenopathy.     Neurological:      Mental Status: She is alert and oriented to person, place, and time.     Skin:     General: Skin is warm and dry.     Psychiatric:         Mood and Affect: Mood normal.         Behavior: Behavior normal.   Vitals reviewed.

## 2025-06-09 ENCOUNTER — ANNUAL EXAM (OUTPATIENT)
Dept: OBGYN CLINIC | Facility: MEDICAL CENTER | Age: 60
End: 2025-06-09
Payer: COMMERCIAL

## 2025-06-09 VITALS
BODY MASS INDEX: 25.21 KG/M2 | DIASTOLIC BLOOD PRESSURE: 70 MMHG | SYSTOLIC BLOOD PRESSURE: 118 MMHG | WEIGHT: 137 LBS | HEIGHT: 62 IN

## 2025-06-09 DIAGNOSIS — N94.10 DYSPAREUNIA IN FEMALE: ICD-10-CM

## 2025-06-09 DIAGNOSIS — N93.0 POSTCOITAL BLEEDING: ICD-10-CM

## 2025-06-09 DIAGNOSIS — Z12.4 SCREENING FOR CERVICAL CANCER: ICD-10-CM

## 2025-06-09 DIAGNOSIS — Z01.419 ENCOUNTER FOR GYNECOLOGICAL EXAMINATION (GENERAL) (ROUTINE) WITHOUT ABNORMAL FINDINGS: Primary | ICD-10-CM

## 2025-06-09 DIAGNOSIS — N95.2 VAGINAL ATROPHY: ICD-10-CM

## 2025-06-09 PROCEDURE — G0476 HPV COMBO ASSAY CA SCREEN: HCPCS | Performed by: CLINICAL NURSE SPECIALIST

## 2025-06-09 PROCEDURE — G0145 SCR C/V CYTO,THINLAYER,RESCR: HCPCS | Performed by: CLINICAL NURSE SPECIALIST

## 2025-06-09 PROCEDURE — S0612 ANNUAL GYNECOLOGICAL EXAMINA: HCPCS | Performed by: CLINICAL NURSE SPECIALIST

## 2025-06-09 RX ORDER — ESTRADIOL 0.1 MG/G
CREAM VAGINAL
Qty: 42.5 G | Refills: 1 | Status: SHIPPED | OUTPATIENT
Start: 2025-06-09 | End: 2025-07-07

## 2025-06-09 NOTE — ASSESSMENT & PLAN NOTE
Continues to c/o symptoms.   Both w/ insertion (vaginal) and deeper penetrative sex. + levator and obturator ttp.   Suspect combination of PFD and Vaginal atrophy. US done last yr for same (and PCB) and stable. + fibroids- which could contribute. Pelvic PT ordered along wit vaginal estrogen for atrophy. Will f/u in 4-6 months

## 2025-06-09 NOTE — ASSESSMENT & PLAN NOTE
Continues to have occasional spotting with wiping, but only after sex which is not often. US last yr with thin ET, EMB was deferred at that point. Last pap in 2023- Some mild friability on exam. Pap collected. Suspect due to vaginal atrophy-see other A&P

## 2025-06-13 ENCOUNTER — RESULTS FOLLOW-UP (OUTPATIENT)
Dept: OBGYN CLINIC | Facility: MEDICAL CENTER | Age: 60
End: 2025-06-13

## 2025-06-13 LAB
LAB AP GYN PRIMARY INTERPRETATION: NORMAL
Lab: NORMAL

## 2025-07-11 NOTE — TELEPHONE ENCOUNTER
fyi- s/w patient   Traveling for work , several flights within the [place]  No international flights, Cold symptoms x 3 days  Nasal congestion, sore throat  Denies fever, coughing or SOB  Told patient this sounds like a cold , viral precautions given  OTC's, rest , fluids  36.8

## (undated) DEVICE — INTENDED FOR TISSUE SEPARATION, AND OTHER PROCEDURES THAT REQUIRE A SHARP SURGICAL BLADE TO PUNCTURE OR CUT.: Brand: BARD-PARKER ® CARBON RIB-BACK BLADES

## (undated) DEVICE — GLOVE SRG BIOGEL 6

## (undated) DEVICE — BLADE SAGITTAL 25.6 X 9.5MM

## (undated) DEVICE — SUT VICRYL 3-0 SH 27 IN J416H

## (undated) DEVICE — LIGHT HANDLE COVER SLEEVE DISP BLUE STELLAR

## (undated) DEVICE — ACE WRAP 4 IN STERILE

## (undated) DEVICE — KERLIX BANDAGE ROLL: Brand: KERLIX

## (undated) DEVICE — SUT ETHILON 4-0 PS-2 18 IN 1667H

## (undated) DEVICE — MEDI-VAC YANKAUER SUCTION HANDLE W/STRAIGHT TIP & CONTROL VENT: Brand: CARDINAL HEALTH

## (undated) DEVICE — OCCLUSIVE GAUZE STRIP,3% BISMUTH TRIBROMOPHENATE IN PETROLATUM BLEND: Brand: XEROFORM

## (undated) DEVICE — REM POLYHESIVE ADULT PATIENT RETURN ELECTRODE: Brand: VALLEYLAB

## (undated) DEVICE — CURITY STRETCH BANDAGE: Brand: CURITY

## (undated) DEVICE — TUBING SUCTION 5MM X 12 FT

## (undated) DEVICE — COBAN 2 IN UNSTERILE

## (undated) DEVICE — SPONGE PVP SCRUB WING STERILE

## (undated) DEVICE — GAUZE SPONGES,16 PLY: Brand: CURITY

## (undated) DEVICE — UNIVERSAL  MINOR EXTREMITY PK: Brand: CARDINAL HEALTH

## (undated) DEVICE — STOCKINETTE REGULAR

## (undated) DEVICE — SPONGE STICK WITH PVP-I: Brand: KENDALL